# Patient Record
Sex: MALE | Race: WHITE | NOT HISPANIC OR LATINO | Employment: OTHER | URBAN - METROPOLITAN AREA
[De-identification: names, ages, dates, MRNs, and addresses within clinical notes are randomized per-mention and may not be internally consistent; named-entity substitution may affect disease eponyms.]

---

## 2017-01-27 ENCOUNTER — OFFICE VISIT (OUTPATIENT)
Dept: CARDIOLOGY | Facility: CLINIC | Age: 67
End: 2017-01-27

## 2017-01-27 VITALS
HEIGHT: 63 IN | BODY MASS INDEX: 31.01 KG/M2 | DIASTOLIC BLOOD PRESSURE: 72 MMHG | SYSTOLIC BLOOD PRESSURE: 132 MMHG | WEIGHT: 175 LBS | HEART RATE: 88 BPM

## 2017-01-27 DIAGNOSIS — I25.10 CORONARY ARTERY DISEASE INVOLVING NATIVE CORONARY ARTERY OF NATIVE HEART WITHOUT ANGINA PECTORIS: Primary | ICD-10-CM

## 2017-01-27 PROCEDURE — 99213 OFFICE O/P EST LOW 20 MIN: CPT | Performed by: INTERNAL MEDICINE

## 2017-01-27 RX ORDER — NITROGLYCERIN 0.4 MG/1
TABLET SUBLINGUAL
Qty: 50 TABLET | Refills: 3 | Status: SHIPPED | OUTPATIENT
Start: 2017-01-27 | End: 2018-04-06

## 2017-01-27 NOTE — PROGRESS NOTES
Date of Office Visit: 2017  Encounter Provider: Barak Magallon MD  Place of Service: [facility] CARDIOLOGY  Patient Name: David Austin Jr.  :1950    Chief complaint: Follow-up for coronary artery disease     History of Present Illness:    Dear Dr. Roman:      I again had the pleasure of seeing your patient in cardiology office on 2017. As  you well known, he is a very pleasant 66 year-old white male with a past medical history  significant for coronary artery disease, hypertension, and hyperlipidemia who presents for  Follow-up. The patient was first evaluated in our chest pain unit in the office on  2015. At that time, he had been experiencing exertional chest pressure accompanied  by significant dyspnea on exertion for approximately 1 month. His symptoms were very  concerning for unstable angina based on his description and the nature of the progression  of the symptoms. He subsequently underwent a diagnostic left hear catheterization on  2015 by Dr. Lau. At that time, he was found to have very significant coronary  artery disease. His LAD was large and had a 70% lesion followed by an 80% lesion in the  mid segment. The right coronary artery was also large and dominant and had diffuse 70%  mid disease, as well as a 90% focal distal stenosis followed by a 95% distal focal  stenosis as well. He subsequently underwent placement of a 3.25 x 38 mm Xience Alpine  drug eluting stent overlapping with a 3.0 x 8 mm Xience Alpine drug eluting stent to cover  both distal right coronary artery lesions. He also had a placement of a 4.0 x 38 mm Xience  drug eluting stent to the mid-right coronary artery which overlapped with the distal  stents. He also had placement of a 3.25 x 33 mm Xience Alpine drug eluting stent to the  mid-LAD at that time as well.      The patient presents today for follow-up.  Overall, he seems to be doing well.  He has   had no  chest pain or significant shortness of breath.  He does continue to smoke   approximately 2 packs of cigarettes per day.    Past Medical History   Diagnosis Date   • Chronic back pain    • Coronary artery disease 04/28/2015     s/p 2 CARMEN to mid to distal RCA and CARMEN to mid LAD on 4/28/15   • Hyperlipidemia    • Hypertension        Past Surgical History   Procedure Laterality Date   • Appendectomy     • Back surgery       x2   • Shoulder surgery       right    • Vasectomy     • Cardiac catheterization     • Coronary angioplasty  04/28/2015     A) Xience Alpine drug eluting stent [id number] overlapping with 3.0x8mm Xience alpine drug eluting stent to cover distal RCA . B) 4.0 x 38mm Xience Alpine drug eluting stent cover the mid RCA which overlaps with the distal stents. C) 3.25 x 33mm Xience Alpine drug eluting stent to mid LAD.        Current Outpatient Prescriptions on File Prior to Visit   Medication Sig Dispense Refill   • amLODIPine (NORVASC) 5 MG tablet Take  by mouth.     • Ascorbic Acid (VITAMIN C) 500 MG capsule Take  by mouth.     • aspirin 325 MG tablet Take  by mouth.     • atorvastatin (LIPITOR) 20 MG tablet TAKE 1 TABLET DAILY AT BEDTIME. 90 tablet 1   • HYDROcodone-acetaminophen (VICODIN) 7.5-500 MG per tablet Take  by mouth.     • metoprolol succinate XL (TOPROL-XL) 50 MG 24 hr tablet Take  by mouth.     • [DISCONTINUED] clopidogrel (PLAVIX) 75 MG tablet TAKE 1 TABLET BY MOUTH EVERY DAY 30 tablet 3     No current facility-administered medications on file prior to visit.      Allergies as of 01/27/2017 - Adrian as Reviewed 01/27/2017   Allergen Reaction Noted   • No known drug allergy  07/02/2016     Social History     Social History   • Marital status:      Spouse name: N/A   • Number of children: N/A   • Years of education: N/A     Occupational History   • Not on file.     Social History Main Topics   • Smoking status: Former Smoker     Packs/day: 2.00     Years: 50.00   • Smokeless tobacco:  "Never Used      Comment: Smoked 2-3 ppd for 50 years.  Quit at time of stents in 4/15.   • Alcohol use No   • Drug use: No   • Sexual activity: Not on file     Other Topics Concern   • Not on file     Social History Narrative     Family History   Problem Relation Age of Onset   • Coronary artery disease Neg Hx        Review of Systems   All other systems reviewed and are negative.    Objective:     Vitals:    01/27/17 1111   BP: 132/72   Pulse: 88   Weight: 175 lb (79.4 kg)   Height: 63\" (160 cm)     Body mass index is 31 kg/(m^2).    Physical Exam   Constitutional: He is oriented to person, place, and time. He appears well-developed and well-nourished.   HENT:   Head: Normocephalic and atraumatic.   Eyes: Conjunctivae are normal.   Neck: Neck supple.   Cardiovascular: Normal rate and regular rhythm.  Exam reveals no gallop and no friction rub.    No murmur heard.  Pulmonary/Chest: Effort normal. He has decreased breath sounds. He has rhonchi.   Abdominal: Soft. There is no tenderness.   Musculoskeletal: He exhibits no edema.   Neurological: He is alert and oriented to person, place, and time.   Skin: Skin is warm.   Psychiatric: He has a normal mood and affect. His behavior is normal.     Lab Review:   Procedures    Cardiac Procedures:  1. Left heart catheterization on 04/28/2015 showed the following; the left main   coronary artery was normal. The left circumflex had 30% diffuse mid disease  and 20% to 30% distal disease. The LAD was large and had a 70% to 80%   focal mid-stenosis. The right coronary artery was dominant and had diffuse   70% mid disease followed by a 905 focal distal stenosis which was then   followed by another 95% more distal stenosis.   a. Status post placement of a 3.25 x 38 mm Xience Alpine drug eluting stent   overlapping with a 3.0 x 8 mm Xience Alpine drug eluting stent to cover the   distal right coronary artery lesion.   b. Status post placement of a 4.0 x 38 mm Xience Alpine drug " eluting stent   to the mid right coronary artery which overlaps with the distal stents.   c. Status post placement of a 3.25 x 33 mm Xience Alpine drug eluting stent   to the mid LAD.      Assessment:       Diagnosis Plan   1. Coronary artery disease involving native coronary artery of native heart without angina pectoris       Plan:       As noted, the patient has not had any significant chest pain or shortness of breath.    Unfortunately, he continues to smoke approximately 2 packs of cigarettes per day.  I   discussed smoking cessation extensively with him again today.  Unfortunately, he does   not have a desire to quit currently.  I will continue to address this with him.  His blood   pressure is stable.  He is going to continue on the aspirin for life.  He will continue on   the Lipitor and Toprol-XL as well.  I will plan on seeing him back in the office in 8 months.    I did prescribe when necessary nitroglycerin to his pharmacy as well.    Coronary Artery Disease  Assessment  • The patient has no angina    Plan  • Lifestyle modifications discussed include adhering to a heart healthy diet, avoidance of tobacco products, maintenance of a healthy weight, medication compliance, regular exercise and regular monitoring of cholesterol and blood pressure    Subjective - Objective  • There has been a previous stent procedure using CARMEN on or around 4/18/2015  • Current antiplatelet therapy includes aspirin 325 mg

## 2017-09-29 ENCOUNTER — OFFICE VISIT (OUTPATIENT)
Dept: CARDIOLOGY | Facility: CLINIC | Age: 67
End: 2017-09-29

## 2017-09-29 VITALS
HEIGHT: 63 IN | DIASTOLIC BLOOD PRESSURE: 70 MMHG | SYSTOLIC BLOOD PRESSURE: 118 MMHG | HEART RATE: 77 BPM | OXYGEN SATURATION: 96 % | WEIGHT: 170 LBS | BODY MASS INDEX: 30.12 KG/M2

## 2017-09-29 DIAGNOSIS — I10 ESSENTIAL HYPERTENSION: ICD-10-CM

## 2017-09-29 DIAGNOSIS — I25.10 CORONARY ARTERY DISEASE INVOLVING NATIVE CORONARY ARTERY OF NATIVE HEART WITHOUT ANGINA PECTORIS: Primary | ICD-10-CM

## 2017-09-29 PROCEDURE — 99213 OFFICE O/P EST LOW 20 MIN: CPT | Performed by: INTERNAL MEDICINE

## 2017-10-08 NOTE — PROGRESS NOTES
Date of Office Visit: 2017  Encounter Provider: Barak Magallon MD  Place of Service: [facility] CARDIOLOGY  Patient Name: David Austin Jr.  :1950    Chief complaint: Follow-up for coronary artery disease.     History of Present Illness:    Dear Dr. Roman:     I again had the pleasure of seeing your patient in cardiology office on 2017. As  you well known, he is a very pleasant 66 year-old white male with a past medical history  significant for coronary artery disease, hypertension, and hyperlipidemia who presents   for follow-up. The patient was first evaluated in our chest pain unit in the office on   2015. At that time, he had been experiencing exertional chest pressure   accompanied by significant dyspnea on exertion for approximately 1 month. His   symptoms were very concerning for unstable angina based on his description and the   nature of the progression of the symptoms. He subsequently underwent a diagnostic   left hear catheterization on 2015 by Dr. Lau. At that time, he was found to have   very significant coronary artery disease. His LAD was large and had a 70% lesion   followed by an 80% lesion in the mid segment. The right coronary artery was also   large and dominant and had diffuse 70% mid disease, as well as a 90% focal distal   stenosis followed by a 95% distal focal stenosis as well. He subsequently underwent   placement of a 3.25 x 38 mm Xience Alpine drug eluting stent overlapping with a 3.0 x   8 mm Xience Alpine drug eluting stent to coverboth distal right coronary artery lesions.   He also had a placement of a 4.0 x 38 mm Xience drug eluting stent to the mid-right   coronary artery which overlapped with the distal stents. He also had placement of a   3.25 x 33 mm Xience Alpine drug eluting stent to the mid LAD at that time as well.    The patient presents today for follow-up.  Unfortunately, he is still smoking.  He has  not   had any chest pain or shortness of breath.  His blood pressure is excellent today 118/70.    Past Medical History:   Diagnosis Date   • Chronic back pain    • Coronary artery disease 04/28/2015    s/p 2 CARMEN to mid to distal RCA and CAREMN to mid LAD on 4/28/15   • Hyperlipidemia    • Hypertension        Past Surgical History:   Procedure Laterality Date   • APPENDECTOMY     • BACK SURGERY      x2   • CARDIAC CATHETERIZATION     • CORONARY ANGIOPLASTY  04/28/2015    A) Xience Alpine drug eluting stent [id number] overlapping with 3.0x8mm Xience alpine drug eluting stent to cover distal RCA . B) 4.0 x 38mm Xience Alpine drug eluting stent cover the mid RCA which overlaps with the distal stents. C) 3.25 x 33mm Xience Alpine drug eluting stent to mid LAD.    • SHOULDER SURGERY      right    • VASECTOMY         Current Outpatient Prescriptions on File Prior to Visit   Medication Sig Dispense Refill   • amLODIPine (NORVASC) 5 MG tablet Take  by mouth.     • Ascorbic Acid (VITAMIN C) 500 MG capsule Take  by mouth.     • aspirin 325 MG tablet Take  by mouth.     • atorvastatin (LIPITOR) 20 MG tablet TAKE 1 TABLET DAILY AT BEDTIME. 90 tablet 1   • HYDROcodone-acetaminophen (VICODIN) 7.5-500 MG per tablet Take  by mouth.     • metoprolol succinate XL (TOPROL-XL) 50 MG 24 hr tablet 50 mg Daily.     • nitroglycerin (NITROSTAT) 0.4 MG SL tablet 1 under the tongue as needed for angina, may repeat q5mins for up three doses 50 tablet 3     No current facility-administered medications on file prior to visit.      Allergies as of 09/29/2017 - Adrian as Reviewed 09/29/2017   Allergen Reaction Noted   • No known drug allergy  07/02/2016     Social History     Social History   • Marital status:      Spouse name: N/A   • Number of children: N/A   • Years of education: N/A     Occupational History   • Not on file.     Social History Main Topics   • Smoking status: Former Smoker     Packs/day: 2.00     Years: 50.00   • Smokeless  "tobacco: Never Used      Comment: Smoked 2-3 ppd for 50 years.  Quit at time of stents in 4/15.   • Alcohol use No   • Drug use: No   • Sexual activity: Not on file     Other Topics Concern   • Not on file     Social History Narrative     Family History   Problem Relation Age of Onset   • Coronary artery disease Neg Hx        Review of Systems   Constitution: Positive for malaise/fatigue.   All other systems reviewed and are negative.    Objective:     Vitals:    09/29/17 1023   BP: 118/70   Pulse: 77   SpO2: 96%   Weight: 170 lb (77.1 kg)   Height: 63\" (160 cm)     Body mass index is 30.11 kg/(m^2).    Physical Exam   Constitutional: He is oriented to person, place, and time. He appears well-developed and well-nourished.   HENT:   Head: Normocephalic and atraumatic.   Eyes: Conjunctivae are normal.   Neck: Neck supple.   Cardiovascular: Normal rate and regular rhythm.  Exam reveals no gallop and no friction rub.    No murmur heard.  Pulmonary/Chest: Effort normal. He has decreased breath sounds. He has rhonchi.   Abdominal: Soft. There is no tenderness.   Musculoskeletal: He exhibits no edema.   Neurological: He is alert and oriented to person, place, and time.   Skin: Skin is warm.   Psychiatric: He has a normal mood and affect. His behavior is normal.     Lab Review:   Procedures    Cardiac Procedures:  1. Left heart catheterization on 04/28/2015: The left main coronary artery was   normal. The left circumflex had 30% diffuse mid disease and 20% to 30%   distal disease. The LAD was large and had a 70% to 80% focal mid-stenosis.   The right coronary artery was dominant and had diffuse 70% mid disease   followed by a 905 focal distal stenosis which was then followed by another   95% more distal stenosis.   a. Status post placement of a 3.25 x 38 mm Xience Alpine drug eluting stent   overlapping with a 3.0 x 8 mm Xience Alpine drug eluting stent to cover the   distal right coronary artery lesion.   b. Status post " placement of a 4.0 x 38 mm Xience Alpine drug eluting stent   to the mid right coronary artery which overlaps with the distal stents.   c. Status post placement of a 3.25 x 33 mm Xience Alpine drug eluting stent   to the mid LAD.     Assessment:       Diagnosis Plan   1. Coronary artery disease involving native coronary artery of native heart without angina pectoris     2. Essential hypertension       Plan:       The patient seems to be stable from a cardiac standpoint this time.  Again, he does   continue to smoke, and I have discussed smoking cessation with a multiple times.  I   emphasized how important this is for his overall cardiovascular health. However, I do   not feel that he is ready to quit at this point.  He will continue on aspirin for life.  He is   taking Lipitor without any difficulty, and he is scheduled to get his labs drawn next   month at his regular primary care appointment.  I have asked him to send me a copy   of the lipid panel.  His blood pressure is under excellent control with the Toprol-XL   and amlodipine.  For now, I did not change any of his medications.  I will have him   follow-up in the next 8 months unless other issues arise.    Coronary Artery Disease  Assessment  • The patient has no angina   Plan  • Lifestyle modifications discussed include adhering to a heart healthy diet, avoidance of tobacco products, maintenance of a healthy weight, medication compliance, regular exercise and regular monitoring of cholesterol and blood pressure   Subjective - Objective  • There has been a previous stent procedure using CARMEN on or around 4/18/2015  • Current antiplatelet therapy includes aspirin 325 mg

## 2018-04-06 ENCOUNTER — APPOINTMENT (OUTPATIENT)
Dept: GENERAL RADIOLOGY | Facility: HOSPITAL | Age: 68
End: 2018-04-06

## 2018-04-06 ENCOUNTER — APPOINTMENT (OUTPATIENT)
Dept: CT IMAGING | Facility: HOSPITAL | Age: 68
End: 2018-04-06

## 2018-04-06 ENCOUNTER — HOSPITAL ENCOUNTER (INPATIENT)
Facility: HOSPITAL | Age: 68
LOS: 2 days | Discharge: HOME | End: 2018-04-08
Attending: EMERGENCY MEDICINE | Admitting: INTERNAL MEDICINE

## 2018-04-06 DIAGNOSIS — I20.0 UNSTABLE ANGINA (HCC): ICD-10-CM

## 2018-04-06 DIAGNOSIS — R07.9 CHEST PAIN, UNSPECIFIED TYPE: Primary | ICD-10-CM

## 2018-04-06 PROBLEM — I10 HYPERTENSION: Status: ACTIVE | Noted: 2018-04-06

## 2018-04-06 PROBLEM — D72.829 LEUKOCYTOSIS: Status: ACTIVE | Noted: 2018-04-06

## 2018-04-06 PROBLEM — I25.10 CAD (CORONARY ARTERY DISEASE): Status: ACTIVE | Noted: 2018-04-06

## 2018-04-06 LAB
ALBUMIN SERPL-MCNC: 4.2 G/DL (ref 3.5–5.2)
ALBUMIN/GLOB SERPL: 1.4 G/DL
ALP SERPL-CCNC: 87 U/L (ref 39–117)
ALT SERPL W P-5'-P-CCNC: 29 U/L (ref 1–41)
ANION GAP SERPL CALCULATED.3IONS-SCNC: 11.6 MMOL/L
AST SERPL-CCNC: 14 U/L (ref 1–40)
B PERT DNA SPEC QL NAA+PROBE: NOT DETECTED
BASOPHILS # BLD AUTO: 0.01 10*3/MM3 (ref 0–0.2)
BASOPHILS NFR BLD AUTO: 0.1 % (ref 0–1.5)
BILIRUB SERPL-MCNC: 0.4 MG/DL (ref 0.1–1.2)
BILIRUB UR QL STRIP: NEGATIVE
BUN BLD-MCNC: 22 MG/DL (ref 8–23)
BUN/CREAT SERPL: 28.2 (ref 7–25)
C PNEUM DNA NPH QL NAA+NON-PROBE: NOT DETECTED
CALCIUM SPEC-SCNC: 9.5 MG/DL (ref 8.6–10.5)
CHLORIDE SERPL-SCNC: 102 MMOL/L (ref 98–107)
CLARITY UR: CLEAR
CO2 SERPL-SCNC: 26.4 MMOL/L (ref 22–29)
COLOR UR: YELLOW
CREAT BLD-MCNC: 0.78 MG/DL (ref 0.76–1.27)
D-LACTATE SERPL-SCNC: 1.6 MMOL/L (ref 0.5–2)
DEPRECATED RDW RBC AUTO: 46.7 FL (ref 37–54)
EOSINOPHIL # BLD AUTO: 0.03 10*3/MM3 (ref 0–0.7)
EOSINOPHIL NFR BLD AUTO: 0.2 % (ref 0.3–6.2)
ERYTHROCYTE [DISTWIDTH] IN BLOOD BY AUTOMATED COUNT: 14.3 % (ref 11.5–14.5)
ERYTHROCYTE [SEDIMENTATION RATE] IN BLOOD: 11 MM/HR (ref 0–20)
FLUAV H1 2009 PAND RNA NPH QL NAA+PROBE: NOT DETECTED
FLUAV H1 HA GENE NPH QL NAA+PROBE: NOT DETECTED
FLUAV H3 RNA NPH QL NAA+PROBE: NOT DETECTED
FLUAV SUBTYP SPEC NAA+PROBE: NOT DETECTED
FLUBV RNA ISLT QL NAA+PROBE: NOT DETECTED
GFR SERPL CREATININE-BSD FRML MDRD: 99 ML/MIN/1.73
GLOBULIN UR ELPH-MCNC: 2.9 GM/DL
GLUCOSE BLD-MCNC: 109 MG/DL (ref 65–99)
GLUCOSE UR STRIP-MCNC: NEGATIVE MG/DL
HADV DNA SPEC NAA+PROBE: NOT DETECTED
HCOV 229E RNA SPEC QL NAA+PROBE: NOT DETECTED
HCOV HKU1 RNA SPEC QL NAA+PROBE: NOT DETECTED
HCOV NL63 RNA SPEC QL NAA+PROBE: NOT DETECTED
HCOV OC43 RNA SPEC QL NAA+PROBE: NOT DETECTED
HCT VFR BLD AUTO: 43.3 % (ref 40.4–52.2)
HGB BLD-MCNC: 14.4 G/DL (ref 13.7–17.6)
HGB UR QL STRIP.AUTO: NEGATIVE
HMPV RNA NPH QL NAA+NON-PROBE: NOT DETECTED
HOLD SPECIMEN: NORMAL
HOLD SPECIMEN: NORMAL
HPIV1 RNA SPEC QL NAA+PROBE: NOT DETECTED
HPIV2 RNA SPEC QL NAA+PROBE: NOT DETECTED
HPIV3 RNA NPH QL NAA+PROBE: NOT DETECTED
HPIV4 P GENE NPH QL NAA+PROBE: NOT DETECTED
IMM GRANULOCYTES # BLD: 0.1 10*3/MM3 (ref 0–0.03)
IMM GRANULOCYTES NFR BLD: 0.6 % (ref 0–0.5)
KETONES UR QL STRIP: NEGATIVE
LEUKOCYTE ESTERASE UR QL STRIP.AUTO: NEGATIVE
LYMPHOCYTES # BLD AUTO: 2.48 10*3/MM3 (ref 0.9–4.8)
LYMPHOCYTES NFR BLD AUTO: 13.9 % (ref 19.6–45.3)
M PNEUMO IGG SER IA-ACNC: NOT DETECTED
MCH RBC QN AUTO: 29.8 PG (ref 27–32.7)
MCHC RBC AUTO-ENTMCNC: 33.3 G/DL (ref 32.6–36.4)
MCV RBC AUTO: 89.6 FL (ref 79.8–96.2)
MONOCYTES # BLD AUTO: 0.93 10*3/MM3 (ref 0.2–1.2)
MONOCYTES NFR BLD AUTO: 5.2 % (ref 5–12)
NEUTROPHILS # BLD AUTO: 14.27 10*3/MM3 (ref 1.9–8.1)
NEUTROPHILS NFR BLD AUTO: 80 % (ref 42.7–76)
NITRITE UR QL STRIP: NEGATIVE
PH UR STRIP.AUTO: 6 [PH] (ref 5–8)
PLATELET # BLD AUTO: 344 10*3/MM3 (ref 140–500)
PMV BLD AUTO: 9.8 FL (ref 6–12)
POTASSIUM BLD-SCNC: 4.2 MMOL/L (ref 3.5–5.2)
PROCALCITONIN SERPL-MCNC: 0.04 NG/ML (ref 0.1–0.25)
PROT SERPL-MCNC: 7.1 G/DL (ref 6–8.5)
PROT UR QL STRIP: NEGATIVE
RBC # BLD AUTO: 4.83 10*6/MM3 (ref 4.6–6)
RHINOVIRUS RNA SPEC NAA+PROBE: NOT DETECTED
RSV RNA NPH QL NAA+NON-PROBE: NOT DETECTED
SODIUM BLD-SCNC: 140 MMOL/L (ref 136–145)
SP GR UR STRIP: >=1.03 (ref 1–1.03)
TROPONIN T SERPL-MCNC: <0.01 NG/ML (ref 0–0.03)
UROBILINOGEN UR QL STRIP: NORMAL
WBC NRBC COR # BLD: 17.82 10*3/MM3 (ref 4.5–10.7)
WHOLE BLOOD HOLD SPECIMEN: NORMAL
WHOLE BLOOD HOLD SPECIMEN: NORMAL

## 2018-04-06 PROCEDURE — 85025 COMPLETE CBC W/AUTO DIFF WBC: CPT

## 2018-04-06 PROCEDURE — 0 IOPAMIDOL PER 1 ML: Performed by: INTERNAL MEDICINE

## 2018-04-06 PROCEDURE — 83605 ASSAY OF LACTIC ACID: CPT | Performed by: EMERGENCY MEDICINE

## 2018-04-06 PROCEDURE — 81003 URINALYSIS AUTO W/O SCOPE: CPT | Performed by: EMERGENCY MEDICINE

## 2018-04-06 PROCEDURE — 84145 PROCALCITONIN (PCT): CPT | Performed by: EMERGENCY MEDICINE

## 2018-04-06 PROCEDURE — 93005 ELECTROCARDIOGRAM TRACING: CPT

## 2018-04-06 PROCEDURE — 87486 CHLMYD PNEUM DNA AMP PROBE: CPT | Performed by: EMERGENCY MEDICINE

## 2018-04-06 PROCEDURE — 84484 ASSAY OF TROPONIN QUANT: CPT

## 2018-04-06 PROCEDURE — 87798 DETECT AGENT NOS DNA AMP: CPT | Performed by: EMERGENCY MEDICINE

## 2018-04-06 PROCEDURE — 93005 ELECTROCARDIOGRAM TRACING: CPT | Performed by: EMERGENCY MEDICINE

## 2018-04-06 PROCEDURE — 71275 CT ANGIOGRAPHY CHEST: CPT

## 2018-04-06 PROCEDURE — 85652 RBC SED RATE AUTOMATED: CPT | Performed by: EMERGENCY MEDICINE

## 2018-04-06 PROCEDURE — 99285 EMERGENCY DEPT VISIT HI MDM: CPT

## 2018-04-06 PROCEDURE — 87040 BLOOD CULTURE FOR BACTERIA: CPT | Performed by: EMERGENCY MEDICINE

## 2018-04-06 PROCEDURE — 87581 M.PNEUMON DNA AMP PROBE: CPT | Performed by: EMERGENCY MEDICINE

## 2018-04-06 PROCEDURE — 25010000002 ENOXAPARIN PER 10 MG: Performed by: INTERNAL MEDICINE

## 2018-04-06 PROCEDURE — 87633 RESP VIRUS 12-25 TARGETS: CPT | Performed by: EMERGENCY MEDICINE

## 2018-04-06 PROCEDURE — 80053 COMPREHEN METABOLIC PANEL: CPT

## 2018-04-06 PROCEDURE — 93010 ELECTROCARDIOGRAM REPORT: CPT | Performed by: INTERNAL MEDICINE

## 2018-04-06 PROCEDURE — 71046 X-RAY EXAM CHEST 2 VIEWS: CPT

## 2018-04-06 RX ORDER — ONDANSETRON 2 MG/ML
4 INJECTION INTRAMUSCULAR; INTRAVENOUS EVERY 6 HOURS PRN
Status: DISCONTINUED | OUTPATIENT
Start: 2018-04-06 | End: 2018-04-08 | Stop reason: HOSPADM

## 2018-04-06 RX ORDER — ACETAMINOPHEN 325 MG/1
650 TABLET ORAL EVERY 4 HOURS PRN
Status: DISCONTINUED | OUTPATIENT
Start: 2018-04-06 | End: 2018-04-08 | Stop reason: HOSPADM

## 2018-04-06 RX ORDER — ASPIRIN 81 MG/1
81 TABLET ORAL DAILY
COMMUNITY

## 2018-04-06 RX ORDER — HYDROCODONE BITARTRATE AND ACETAMINOPHEN 7.5; 325 MG/1; MG/1
1 TABLET ORAL 4 TIMES DAILY PRN
COMMUNITY
End: 2019-09-17

## 2018-04-06 RX ORDER — ALUMINA, MAGNESIA, AND SIMETHICONE 2400; 2400; 240 MG/30ML; MG/30ML; MG/30ML
15 SUSPENSION ORAL EVERY 6 HOURS PRN
Status: DISCONTINUED | OUTPATIENT
Start: 2018-04-06 | End: 2018-04-08 | Stop reason: HOSPADM

## 2018-04-06 RX ORDER — NITROGLYCERIN 0.4 MG/1
0.4 TABLET SUBLINGUAL
COMMUNITY
End: 2019-01-17 | Stop reason: SDUPTHER

## 2018-04-06 RX ORDER — NITROGLYCERIN 0.4 MG/1
0.4 TABLET SUBLINGUAL
Status: DISCONTINUED | OUTPATIENT
Start: 2018-04-06 | End: 2018-04-08 | Stop reason: HOSPADM

## 2018-04-06 RX ORDER — ONDANSETRON 4 MG/1
4 TABLET, ORALLY DISINTEGRATING ORAL EVERY 6 HOURS PRN
Status: DISCONTINUED | OUTPATIENT
Start: 2018-04-06 | End: 2018-04-08 | Stop reason: HOSPADM

## 2018-04-06 RX ORDER — ATORVASTATIN CALCIUM 20 MG/1
20 TABLET, FILM COATED ORAL DAILY
Status: ON HOLD | COMMUNITY
End: 2020-03-06 | Stop reason: SDUPTHER

## 2018-04-06 RX ORDER — ONDANSETRON 4 MG/1
4 TABLET, FILM COATED ORAL EVERY 6 HOURS PRN
Status: DISCONTINUED | OUTPATIENT
Start: 2018-04-06 | End: 2018-04-08 | Stop reason: HOSPADM

## 2018-04-06 RX ORDER — NITROGLYCERIN 0.4 MG/1
0.4 TABLET SUBLINGUAL
Status: CANCELLED | OUTPATIENT
Start: 2018-04-06

## 2018-04-06 RX ORDER — SODIUM CHLORIDE 9 MG/ML
100 INJECTION, SOLUTION INTRAVENOUS CONTINUOUS
Status: DISCONTINUED | OUTPATIENT
Start: 2018-04-06 | End: 2018-04-07

## 2018-04-06 RX ORDER — BISACODYL 5 MG/1
5 TABLET, DELAYED RELEASE ORAL DAILY PRN
Status: DISCONTINUED | OUTPATIENT
Start: 2018-04-06 | End: 2018-04-08 | Stop reason: HOSPADM

## 2018-04-06 RX ORDER — SODIUM CHLORIDE 0.9 % (FLUSH) 0.9 %
10 SYRINGE (ML) INJECTION AS NEEDED
Status: DISCONTINUED | OUTPATIENT
Start: 2018-04-06 | End: 2018-04-08 | Stop reason: HOSPADM

## 2018-04-06 RX ADMIN — NITROGLYCERIN 0.4 MG: 0.4 TABLET SUBLINGUAL at 16:31

## 2018-04-06 RX ADMIN — SODIUM CHLORIDE 100 ML/HR: 9 INJECTION, SOLUTION INTRAVENOUS at 20:41

## 2018-04-06 RX ADMIN — ENOXAPARIN SODIUM 40 MG: 40 INJECTION SUBCUTANEOUS at 20:41

## 2018-04-06 RX ADMIN — IOPAMIDOL 95 ML: 755 INJECTION, SOLUTION INTRAVENOUS at 17:52

## 2018-04-06 NOTE — ED TRIAGE NOTES
midsternal chest pain started 0430 called EMS while at court today for chest pain 10/10 states pain 1/10 at this time. 81 mg ASA this morning at home, 1 nitro prior to EMS arrival. EMS gave patient 324 ASA

## 2018-04-06 NOTE — PROGRESS NOTES
Clinical Pharmacy Services: Medication History    David Austin Jr. is a 67 y.o. male presenting to [facility] for   Chief Complaint   Patient presents with   • Chest Pain       He  has a past medical history of Chronic back pain; Coronary artery disease (04/28/2015); Hyperlipidemia; and Hypertension.    Allergies as of 04/06/2018 - Reviewed 04/06/2018   Allergen Reaction Noted   • No known drug allergy  07/02/2016   • Relafen [nabumetone] Rash 04/06/2018       Medication information was obtained from: Patient, family, medication list  Pharmacy and Phone Number: [facility] [phone]    Prior to Admission Medications     Prescriptions Last Dose Informant Patient Reported? Taking?    amLODIPine (NORVASC) 5 MG tablet  Self Yes Yes    Take 5 mg by mouth Daily.    Ascorbic Acid (VITAMIN C) 500 MG capsule  Self Yes Yes    Take 500 mg by mouth Daily.    aspirin 81 MG EC tablet  Self Yes Yes    Take 81 mg by mouth Daily.    atorvastatin (LIPITOR) 20 MG tablet  Self Yes Yes    Take 20 mg by mouth Daily.    HYDROcodone-acetaminophen (NORCO) 7.5-325 MG per tablet  Self Yes Yes    Take 1 tablet by mouth 4 (Four) Times a Day As Needed for Moderate Pain .    metoprolol succinate XL (TOPROL-XL) 50 MG 24 hr tablet  Self Yes Yes    Take 50 mg by mouth Daily.    nitroglycerin (NITROSTAT) 0.4 MG SL tablet  Self Yes Yes    Place 0.4 mg under the tongue Every 5 (Five) Minutes As Needed for Chest Pain. Take no more than 3 doses in 15 minutes.            Medication notes: None    This medication list is complete to the best of my knowledge as of 4/6/2018    Please call if questions.    Windy Quintero, Medication History Technician  4/6/2018 5:47 PM

## 2018-04-06 NOTE — ED NOTES
Started having CP this morning, constant discomfort all day, mid-sternal, patient states he was [place] the court [place] this [place] and it became worse, lasting longer than 10 minutes. Daughter gave him x1 Nitro and patient felt better but still has discomfort. States feels like weights on my chest. Patient has been feeling SOB with productive cough for 3 weeks now. Patient has also been very stressed due to family issues. Has cardiac hx, x2 MIs past.      Maria G Guzmán RN  04/06/18 [id number]

## 2018-04-06 NOTE — ED PROVIDER NOTES
" EMERGENCY DEPARTMENT ENCOUNTER    CHIEF COMPLAINT  Chief Complaint: Chest pain  History given by: Pt and family  History limited by: Pt is a poor historian  Room Number: 42/42  PMD: Richard Roman MD      HPI:  Pt is a 67 y.o. male who presents complaining of central chest pain that woke him up from sleep at 0300 this morning. Pt describes his pain as \"feeling like I need to belch, but when I did that didn't help.\" Pt also c/o nausea and SOA. He denies diaphoresis. He states he took NTG at home which took his pain from a 10/10 to a 2/10. He states EMS gave him 3 ASA PTA. He denies any exacerbating or alleviating factors. Pt reports a hx of a cardiac catheterization in 2015, but states he does not remember the type of chest pain he had associated with that event. He reports he is a current smoker.     Duration:  13 hours  Onset: gradual  Timing: constant  Location: central chest  Radiation: none  Quality: \"feeling like I need to belch, but when I did that didn't help.\"   Intensity/Severity: initially a 10/10, but is now a 2/10  Progression: improving  Associated Symptoms: nausea and SOA  Aggravating Factors: none  Alleviating Factors: NTG  Previous Episodes: PT reports a hx of smoking  Treatment before arrival: Pt had a cardiac cath in 2015    PAST MEDICAL HISTORY  Active Ambulatory Problems     Diagnosis Date Noted   • No Active Ambulatory Problems     Resolved Ambulatory Problems     Diagnosis Date Noted   • No Resolved Ambulatory Problems     Past Medical History:   Diagnosis Date   • Chronic back pain    • Coronary artery disease 04/28/2015   • Hyperlipidemia    • Hypertension        PAST SURGICAL HISTORY  Past Surgical History:   Procedure Laterality Date   • APPENDECTOMY     • BACK SURGERY      x2   • CARDIAC CATHETERIZATION     • CORONARY ANGIOPLASTY  04/28/2015    A) Xience Alpine drug eluting stent [id number] overlapping with 3.0x8mm Xience alpine drug eluting stent to cover distal RCA . B) 4.0 x 38mm " Xience Alpine drug eluting stent cover the mid RCA which overlaps with the distal stents. C) 3.25 x 33mm Xience Alpine drug eluting stent to mid LAD.    • SHOULDER SURGERY      right    • VASECTOMY         FAMILY HISTORY  Family History   Problem Relation Age of Onset   • Coronary artery disease Neg Hx        SOCIAL HISTORY  Social History     Social History   • Marital status:      Spouse name: N/A   • Number of children: N/A   • Years of education: N/A     Occupational History   • Not on file.     Social History Main Topics   • Smoking status: Former Smoker     Packs/day: 2.00     Years: 50.00   • Smokeless tobacco: Never Used      Comment: Smoked 2-3 ppd for 50 years.  Quit at time of stents in 4/15.   • Alcohol use No   • Drug use: No   • Sexual activity: Not on file     Other Topics Concern   • Not on file     Social History Narrative   • No narrative on file       ALLERGIES  No known drug allergy and Relafen [nabumetone]    REVIEW OF SYSTEMS  Review of Systems   Constitutional: Negative for activity change, appetite change, diaphoresis and fever.   HENT: Negative for congestion and sore throat.    Eyes: Negative.    Respiratory: Positive for shortness of breath. Negative for cough.    Cardiovascular: Positive for chest pain. Negative for leg swelling.   Gastrointestinal: Positive for nausea. Negative for abdominal pain, diarrhea and vomiting.   Endocrine: Negative.    Genitourinary: Negative for decreased urine volume and dysuria.   Musculoskeletal: Negative for neck pain.   Skin: Negative for rash and wound.   Allergic/Immunologic: Negative.    Neurological: Negative for weakness, numbness and headaches.   Hematological: Negative.    Psychiatric/Behavioral: Negative.    All other systems reviewed and are negative.      PHYSICAL EXAM  ED Triage Vitals [04/06/18 1254]   Temp Heart Rate Resp BP SpO2   98.5 °F (36.9 °C) 70 16 141/86 97 %      Temp src Heart Rate Source Patient Position BP Location FiO2 (%)    Tympanic Monitor -- -- --       Physical Exam   Constitutional: He is oriented to person, place, and time and well-developed, well-nourished, and in no distress. No distress.   HENT:   Head: Normocephalic and atraumatic.   Eyes: EOM are normal. Pupils are equal, round, and reactive to light.   Neck: Normal range of motion. Neck supple.   Cardiovascular: Normal rate, regular rhythm, normal heart sounds and intact distal pulses.    Pulses:       Radial pulses are 2+ on the right side, and 2+ on the left side.        Dorsalis pedis pulses are 2+ on the right side, and 2+ on the left side.        Posterior tibial pulses are 2+ on the right side, and 2+ on the left side.   Pulmonary/Chest: Effort normal and breath sounds normal. No respiratory distress.   Abdominal: Soft. There is no tenderness. There is no rebound and no guarding.   Musculoskeletal: Normal range of motion. He exhibits no edema (pedal).   Neurological: He is alert and oriented to person, place, and time. He has normal sensation and normal strength.   Skin: Skin is warm and dry.   Psychiatric: Mood and affect normal.   Nursing note and vitals reviewed.      LAB RESULTS  Lab Results (last 24 hours)     Procedure Component Value Units Date/Time    CBC & Differential [[id number]] Collected:  04/06/18 1407    Specimen:  Blood Updated:  04/06/18 1422    Narrative:       The following orders were created for panel order CBC & Differential.  Procedure                               Abnormality         Status                     ---------                               -----------         ------                     CBC Auto Differential[[id number]]        Abnormal            Final result                 Please view results for these tests on the individual orders.    Comprehensive Metabolic Panel [[id number]]  (Abnormal) Collected:  04/06/18 1407    Specimen:  Blood Updated:  04/06/18 1443     Glucose 109 (H) mg/dL      BUN 22 mg/dL      Creatinine 0.78 mg/dL       Sodium 140 mmol/L      Potassium 4.2 mmol/L      Chloride 102 mmol/L      CO2 26.4 mmol/L      Calcium 9.5 mg/dL      Total Protein 7.1 g/dL      Albumin 4.20 g/dL      ALT (SGPT) 29 U/L      AST (SGOT) 14 U/L      Alkaline Phosphatase 87 U/L      Total Bilirubin 0.4 mg/dL      eGFR Non African Amer 99 mL/min/1.73      Globulin 2.9 gm/dL      A/G Ratio 1.4 g/dL      BUN/Creatinine Ratio 28.2 (H)     Anion Gap 11.6 mmol/L     Troponin [[id number]]  (Normal) Collected:  04/06/18 1407    Specimen:  Blood Updated:  04/06/18 1443     Troponin T <0.010 ng/mL     Narrative:       Troponin T Reference Ranges:  Less than 0.03 ng/mL:    Negative for AMI  0.03 to 0.09 ng/mL:      Indeterminant for AMI  Greater than 0.09 ng/mL: Positive for AMI    CBC Auto Differential [[id number]]  (Abnormal) Collected:  04/06/18 1407    Specimen:  Blood Updated:  04/06/18 1422     WBC 17.82 (H) 10*3/mm3      RBC 4.83 10*6/mm3      Hemoglobin 14.4 g/dL      Hematocrit 43.3 %      MCV 89.6 fL      MCH 29.8 pg      MCHC 33.3 g/dL      RDW 14.3 %      RDW-SD 46.7 fl      MPV 9.8 fL      Platelets 344 10*3/mm3      Neutrophil % 80.0 (H) %      Lymphocyte % 13.9 (L) %      Monocyte % 5.2 %      Eosinophil % 0.2 (L) %      Basophil % 0.1 %      Immature Grans % 0.6 (H) %      Neutrophils, Absolute 14.27 (H) 10*3/mm3      Lymphocytes, Absolute 2.48 10*3/mm3      Monocytes, Absolute 0.93 10*3/mm3      Eosinophils, Absolute 0.03 10*3/mm3      Basophils, Absolute 0.01 10*3/mm3      Immature Grans, Absolute 0.10 (H) 10*3/mm3     Sedimentation Rate [[id number]]  (Normal) Collected:  04/06/18 1645    Specimen:  Blood from Arm, Left Updated:  04/06/18 4987     Sed Rate 11 mm/hr           I ordered the above labs and reviewed the results    RADIOLOGY  XR Chest 2 View      FINDINGS: The lungs are well-expanded and clear and the heart and hilar  structures are normal. There is no acute disease.        I ordered the above noted radiological studies. Reviewed by me  in PACS.       PROCEDURES  Procedures    EKG           EKG time: 1321  Rhythm/Rate: nsr, 65  P waves and GA: normal  QRS, axis: narrow complex   ST and T waves: no acute process, normal QT     Interpreted Contemporaneously by me, independently viewed  unchanged compared to prior 04/2015      PROGRESS AND CONSULTS  ED Course     1613 - Informed pt of the results of his unremarkable lab work, CXR, and EKG. Stated to the pt that based on his cardiac history I am likely to admit the pt.     1615 - Consult placed with LCG.    1629 - NTG ordered.     1637 - Consulted with Dr. Carty ([facility] [place]) who states that he will see the pt in consult, but recommends admitting the pt to medicine. Sed rate ordered. Consult placed with A.     1720 - Consulted with Dr. Narayan ([facility]) who agrees to admit the pt. She requests I ordered additional labs and a CTA chest, which I will do.     1757 - Rechecked pt. Informed pt of the consult with Dr. Narayan and plans for admission. Restated his unremarkable labs, CXR, and EKG, but that I would like to admit him because he has a hx of cardiac cath in 2015 and that I am concerned for unstable angina. Pt understands and agrees with plan. All questions answered.     MEDICAL DECISION MAKING  Results were reviewed/discussed with the patient and they were also made aware of online access. Pt also made aware that some labs, such as cultures, will not be resulted during ER visit and follow up with PMD is necessary.     MDM  Number of Diagnoses or Management Options  Chest pain, unspecified type:      Amount and/or Complexity of Data Reviewed  Clinical lab tests: ordered and reviewed (WBC - 17.82  Troponin - negative)  Tests in the radiology section of CPT®: ordered and reviewed (CXR - no acute disease)  Tests in the medicine section of CPT®: reviewed and ordered (See EKG procedure note.)  Decide to obtain previous medical records or to obtain history from someone other than the patient: yes  Review and  summarize past medical records: yes (Heart Cath at 04/2015     PERFORMED:  1.  Left heart catheterization.   2.  Selective coronary angiography.   3.  Left ventricular angiogram.   4.  Drug-eluting stent placement of the mid to distal right coronary artery.   5.  Drug-eluting stent placement of the mid left anterior descending. )  Discuss the patient with other providers: yes (Dr. Carty ([facility] [place]  Dr. Narayan ([facility]))           DIAGNOSIS  Final diagnoses:   Chest pain, unspecified type       DISPOSITION  ADMISSION    Discussed treatment plan and reason for admission with pt/family and admitting physician.  Pt/family voiced understanding of the plan for admission for further testing/treatment as needed.       Latest Documented Vital Signs:  As of 6:22 PM  BP- 132/70 HR- 67 Temp- 98.5 °F (36.9 °C) (Tympanic) O2 sat- 96%    --  Documentation assistance provided by marsha Vega for Dr. Pierre.  Information recorded by the scribe was done at my direction and has been verified and validated by me.         Coy Vega  04/06/18 3217       Adrian Pierre MD  04/06/18 0210

## 2018-04-07 LAB
ANION GAP SERPL CALCULATED.3IONS-SCNC: 16.4 MMOL/L
BASOPHILS # BLD AUTO: 0 10*3/MM3 (ref 0–0.2)
BASOPHILS NFR BLD AUTO: 0 % (ref 0–1.5)
BUN BLD-MCNC: 20 MG/DL (ref 8–23)
BUN/CREAT SERPL: 27 (ref 7–25)
CALCIUM SPEC-SCNC: 9.2 MG/DL (ref 8.6–10.5)
CHLORIDE SERPL-SCNC: 101 MMOL/L (ref 98–107)
CO2 SERPL-SCNC: 22.6 MMOL/L (ref 22–29)
CREAT BLD-MCNC: 0.74 MG/DL (ref 0.76–1.27)
DEPRECATED RDW RBC AUTO: 49.6 FL (ref 37–54)
EOSINOPHIL # BLD AUTO: 0.08 10*3/MM3 (ref 0–0.7)
EOSINOPHIL NFR BLD AUTO: 0.6 % (ref 0.3–6.2)
ERYTHROCYTE [DISTWIDTH] IN BLOOD BY AUTOMATED COUNT: 14.7 % (ref 11.5–14.5)
GFR SERPL CREATININE-BSD FRML MDRD: 105 ML/MIN/1.73
GLUCOSE BLD-MCNC: 114 MG/DL (ref 65–99)
HCT VFR BLD AUTO: 41.5 % (ref 40.4–52.2)
HGB BLD-MCNC: 13.2 G/DL (ref 13.7–17.6)
IMM GRANULOCYTES # BLD: 0.06 10*3/MM3 (ref 0–0.03)
IMM GRANULOCYTES NFR BLD: 0.4 % (ref 0–0.5)
LYMPHOCYTES # BLD AUTO: 2.19 10*3/MM3 (ref 0.9–4.8)
LYMPHOCYTES NFR BLD AUTO: 16.3 % (ref 19.6–45.3)
MCH RBC QN AUTO: 29.6 PG (ref 27–32.7)
MCHC RBC AUTO-ENTMCNC: 31.8 G/DL (ref 32.6–36.4)
MCV RBC AUTO: 93 FL (ref 79.8–96.2)
MONOCYTES # BLD AUTO: 0.9 10*3/MM3 (ref 0.2–1.2)
MONOCYTES NFR BLD AUTO: 6.7 % (ref 5–12)
NEUTROPHILS # BLD AUTO: 10.23 10*3/MM3 (ref 1.9–8.1)
NEUTROPHILS NFR BLD AUTO: 76 % (ref 42.7–76)
PLATELET # BLD AUTO: 272 10*3/MM3 (ref 140–500)
PMV BLD AUTO: 10.3 FL (ref 6–12)
POTASSIUM BLD-SCNC: 3.9 MMOL/L (ref 3.5–5.2)
RBC # BLD AUTO: 4.46 10*6/MM3 (ref 4.6–6)
SODIUM BLD-SCNC: 140 MMOL/L (ref 136–145)
TROPONIN T SERPL-MCNC: <0.01 NG/ML (ref 0–0.03)
WBC NRBC COR # BLD: 13.46 10*3/MM3 (ref 4.5–10.7)

## 2018-04-07 PROCEDURE — G8979 MOBILITY GOAL STATUS: HCPCS

## 2018-04-07 PROCEDURE — 25010000002 ENOXAPARIN PER 10 MG: Performed by: INTERNAL MEDICINE

## 2018-04-07 PROCEDURE — 85025 COMPLETE CBC W/AUTO DIFF WBC: CPT | Performed by: INTERNAL MEDICINE

## 2018-04-07 PROCEDURE — 97161 PT EVAL LOW COMPLEX 20 MIN: CPT

## 2018-04-07 PROCEDURE — 99222 1ST HOSP IP/OBS MODERATE 55: CPT | Performed by: INTERNAL MEDICINE

## 2018-04-07 PROCEDURE — 84484 ASSAY OF TROPONIN QUANT: CPT | Performed by: INTERNAL MEDICINE

## 2018-04-07 PROCEDURE — 36415 COLL VENOUS BLD VENIPUNCTURE: CPT | Performed by: INTERNAL MEDICINE

## 2018-04-07 PROCEDURE — G8978 MOBILITY CURRENT STATUS: HCPCS

## 2018-04-07 PROCEDURE — 97110 THERAPEUTIC EXERCISES: CPT

## 2018-04-07 PROCEDURE — G8980 MOBILITY D/C STATUS: HCPCS

## 2018-04-07 RX ORDER — HYDROCODONE BITARTRATE AND ACETAMINOPHEN 7.5; 325 MG/1; MG/1
1 TABLET ORAL 4 TIMES DAILY PRN
Status: DISCONTINUED | OUTPATIENT
Start: 2018-04-07 | End: 2018-04-08 | Stop reason: HOSPADM

## 2018-04-07 RX ORDER — AMLODIPINE BESYLATE 5 MG/1
5 TABLET ORAL DAILY
Status: DISCONTINUED | OUTPATIENT
Start: 2018-04-07 | End: 2018-04-08 | Stop reason: HOSPADM

## 2018-04-07 RX ORDER — METOPROLOL SUCCINATE 50 MG/1
50 TABLET, EXTENDED RELEASE ORAL DAILY
Status: DISCONTINUED | OUTPATIENT
Start: 2018-04-07 | End: 2018-04-08 | Stop reason: HOSPADM

## 2018-04-07 RX ORDER — SODIUM CHLORIDE 9 MG/ML
50 INJECTION, SOLUTION INTRAVENOUS CONTINUOUS
Status: DISCONTINUED | OUTPATIENT
Start: 2018-04-07 | End: 2018-04-08 | Stop reason: HOSPADM

## 2018-04-07 RX ORDER — NITROGLYCERIN 0.4 MG/1
0.4 TABLET SUBLINGUAL
Status: DISCONTINUED | OUTPATIENT
Start: 2018-04-07 | End: 2018-04-07 | Stop reason: SDUPTHER

## 2018-04-07 RX ORDER — ASPIRIN 81 MG/1
81 TABLET ORAL DAILY
Status: DISCONTINUED | OUTPATIENT
Start: 2018-04-07 | End: 2018-04-08 | Stop reason: HOSPADM

## 2018-04-07 RX ORDER — ATORVASTATIN CALCIUM 20 MG/1
20 TABLET, FILM COATED ORAL DAILY
Status: DISCONTINUED | OUTPATIENT
Start: 2018-04-07 | End: 2018-04-08 | Stop reason: HOSPADM

## 2018-04-07 RX ADMIN — AMLODIPINE BESYLATE 5 MG: 5 TABLET ORAL at 14:03

## 2018-04-07 RX ADMIN — ENOXAPARIN SODIUM 40 MG: 40 INJECTION SUBCUTANEOUS at 20:35

## 2018-04-07 RX ADMIN — METOPROLOL SUCCINATE 50 MG: 50 TABLET, FILM COATED, EXTENDED RELEASE ORAL at 14:03

## 2018-04-07 RX ADMIN — SODIUM CHLORIDE 50 ML/HR: 9 INJECTION, SOLUTION INTRAVENOUS at 20:35

## 2018-04-07 RX ADMIN — ASPIRIN 81 MG: 81 TABLET ORAL at 14:03

## 2018-04-07 RX ADMIN — SODIUM CHLORIDE 100 ML/HR: 9 INJECTION, SOLUTION INTRAVENOUS at 06:33

## 2018-04-07 RX ADMIN — ATORVASTATIN CALCIUM 20 MG: 20 TABLET, FILM COATED ORAL at 14:03

## 2018-04-07 NOTE — H&P
"Name: David Austin Jr. ADMIT: 2018   : 1950  PCP: Richard Roman MD    MRN: [id number] LOS: 1 days   AGE/SEX: 67 y.o. male  ROOM: [facility]/     Chief Complaint   Patient presents with   • Chest Pain        History of Present Illness  Pt is a 67 y.o. male who presents complaining of central chest pain that woke him up from sleep at 0400 this morning. Pt describes his pain as \"feeling like I needed to belch, but when I did that didn't help.\"  This was associated with nausea and SOA. He denies diaphoresis. He states he took NTG at home which took his pain from a 10/10 to a 2/10.  He said the pain continued at a lower level all morning.  [place] and the pain worsened while he was there.  He took another nitroglycerin and the [facility]'s deputy called EMS.  He states EMS gave him 3 ASA PTA. He has a h/o CAD with previous stent placement. He continues to smoke. Cardiology was contacted about the pt but because his white count was elevated they did not want to be admitting.  He has had a cold recently and has had cough and congestion with some fever earlier but none the last few days. No dysuria, diarrhea, headache or skin rashes.      Past Medical History:   Diagnosis Date   • Chronic back pain    • Coronary artery disease 2015    s/p 2 CARMEN to mid to distal RCA and CARMEN to mid LAD on 4/28/15   • Hyperlipidemia    • Hypertension      Past Surgical History:   Procedure Laterality Date   • APPENDECTOMY     • BACK SURGERY      x2   • CARDIAC CATHETERIZATION     • CORONARY ANGIOPLASTY  2015    A) Xience Alpine drug eluting stent [id number] overlapping with 3.0x8mm Xience alpine drug eluting stent to cover distal RCA . B) 4.0 x 38mm Xience Alpine drug eluting stent cover the mid RCA which overlaps with the distal stents. C) 3.25 x 33mm Xience Alpine drug eluting stent to mid LAD.    • SHOULDER SURGERY      right    • VASECTOMY         Allergies:  No known drug allergy and " Relafen [nabumetone]    Prescriptions Prior to Admission   Medication Sig Dispense Refill Last Dose   • amLODIPine (NORVASC) 5 MG tablet Take 5 mg by mouth Daily.   Taking   • Ascorbic Acid (VITAMIN C) 500 MG capsule Take 500 mg by mouth Daily.   Taking   • aspirin 81 MG EC tablet Take 81 mg by mouth Daily.      • atorvastatin (LIPITOR) 20 MG tablet Take 20 mg by mouth Daily.      • HYDROcodone-acetaminophen (NORCO) 7.5-325 MG per tablet Take 1 tablet by mouth 4 (Four) Times a Day As Needed for Moderate Pain .      • metoprolol succinate XL (TOPROL-XL) 50 MG 24 hr tablet Take 50 mg by mouth Daily.   Taking   • nitroglycerin (NITROSTAT) 0.4 MG SL tablet Place 0.4 mg under the tongue Every 5 (Five) Minutes As Needed for Chest Pain. Take no more than 3 doses in 15 minutes.          Social History   Substance Use Topics   • Smoking status: Current Every Day Smoker     Packs/day: 3.00     Years: 50.00     Types: Cigarettes   • Smokeless tobacco: Never Used      Comment: Smoked 2-3 ppd for 50 years.  Quit at time of stents in 4/15.   • Alcohol use No       Family History   Problem Relation Age of Onset   • Coronary artery disease Neg Hx        Review of Systems   Constitutional: weight down 14 # since December, but intentional wt loss. Appetite good. No recent fevers or chills.  HEENT: No vision changes. No rhinorrhea. Had sore throat couple of wks ago when his cold started. Is hard of hearing.   Respiratory:  No history of wheezing.  Has some chronic cough on daily basis.  Cardiovascular: see HPI  No problems with edema  Gastrointestinal: gets occasional heartburn. Bowel movements normal. No blood noted in stools. No melena  Endocrine:  no diabetes   Genitourinary: No difficulty urinating, dysuria or frequency.   Musculoskeletal: No arthralgias or myalgias.   Skin: No rash or wound.   Neurological: Negative for syncope or headaches. No paresthesias or focal weakness  Hematological:  Does not bruise/bleed easily. No h/o  DVTs  Psychiatric/Behavioral: No confusion. The patient is not nervous/anxious.       Objective    Vital Signs  Temp:  [97.5 °F (36.4 °C)-98.5 °F (36.9 °C)] 97.5 °F (36.4 °C)  Heart Rate:  [66-88] 88  Resp:  [16-18] 18  BP: (112-148)/(69-86) 133/69  SpO2:  [94 %-97 %] 96 %  on   ;   Device (Oxygen Therapy): room air  Body mass index is 27.3 kg/m².    Physical Exam   WDWN male in NAD  PERRL, EOMI, sclera nonicteric. Oropharynx benign, edentulous.  Tongue midline, palate elevates symmetrically. Neck supple without adenopathy or thyromegaly. No JVD.  Lungs clear with equal breath sounds bilaterally. No wheezes, rales or rhonchi. Breathing nonlabored  Heart RRR without murmur, gallop or rub.   Back no kyphosis  Abdomen soft, nontender, bowel sounds present throughout.  Extremities no cyanosis, clubbing or edema.   Skin warm & dry  Neuro no gross motor deficits, alert & oriented. Speech fluent.       Results Review:   I reviewed the patient's new clinical results.    Lab Results (last 24 hours)     Procedure Component Value Units Date/Time    CBC & Differential [[id number]] Collected:  04/06/18 1407    Specimen:  Blood Updated:  04/06/18 1422    Narrative:       The following orders were created for panel order CBC & Differential.  Procedure                               Abnormality         Status                     ---------                               -----------         ------                     CBC Auto Differential[[id number]]        Abnormal            Final result                 Please view results for these tests on the individual orders.    Comprehensive Metabolic Panel [[id number]]  (Abnormal) Collected:  04/06/18 1407    Specimen:  Blood Updated:  04/06/18 1443     Glucose 109 (H) mg/dL      BUN 22 mg/dL      Creatinine 0.78 mg/dL      Sodium 140 mmol/L      Potassium 4.2 mmol/L      Chloride 102 mmol/L      CO2 26.4 mmol/L      Calcium 9.5 mg/dL      Total Protein 7.1 g/dL      Albumin 4.20 g/dL      ALT  (SGPT) 29 U/L      AST (SGOT) 14 U/L      Alkaline Phosphatase 87 U/L      Total Bilirubin 0.4 mg/dL      eGFR Non African Amer 99 mL/min/1.73      Globulin 2.9 gm/dL      A/G Ratio 1.4 g/dL      BUN/Creatinine Ratio 28.2 (H)     Anion Gap 11.6 mmol/L     Troponin [[id number]]  (Normal) Collected:  04/06/18 1407    Specimen:  Blood Updated:  04/06/18 1443     Troponin T <0.010 ng/mL     Narrative:       Troponin T Reference Ranges:  Less than 0.03 ng/mL:    Negative for AMI  0.03 to 0.09 ng/mL:      Indeterminant for AMI  Greater than 0.09 ng/mL: Positive for AMI    CBC Auto Differential [[id number]]  (Abnormal) Collected:  04/06/18 1407    Specimen:  Blood Updated:  04/06/18 1422     WBC 17.82 (H) 10*3/mm3      RBC 4.83 10*6/mm3      Hemoglobin 14.4 g/dL      Hematocrit 43.3 %      MCV 89.6 fL      MCH 29.8 pg      MCHC 33.3 g/dL      RDW 14.3 %      RDW-SD 46.7 fl      MPV 9.8 fL      Platelets 344 10*3/mm3      Neutrophil % 80.0 (H) %      Lymphocyte % 13.9 (L) %      Monocyte % 5.2 %      Eosinophil % 0.2 (L) %      Basophil % 0.1 %      Immature Grans % 0.6 (H) %      Neutrophils, Absolute 14.27 (H) 10*3/mm3      Lymphocytes, Absolute 2.48 10*3/mm3      Monocytes, Absolute 0.93 10*3/mm3      Eosinophils, Absolute 0.03 10*3/mm3      Basophils, Absolute 0.01 10*3/mm3      Immature Grans, Absolute 0.10 (H) 10*3/mm3     Sedimentation Rate [[id number]]  (Normal) Collected:  04/06/18 1645    Specimen:  Blood from Arm, Left Updated:  04/06/18 1737     Sed Rate 11 mm/hr     Urinalysis With / Microscopic If Indicated - Urine, Clean Catch [[id number]]  (Normal) Collected:  04/06/18 1820    Specimen:  Urine from Urine, Clean Catch Updated:  04/06/18 1837     Color, UA Yellow     Appearance, UA Clear     pH, UA 6.0     Specific Gravity, UA >=1.030     Glucose, UA Negative     Ketones, UA Negative     Bilirubin, UA Negative     Blood, UA Negative     Protein, UA Negative     Leuk Esterase, UA Negative     Nitrite, UA Negative  "    Urobilinogen, UA 0.2 E.U./dL    Narrative:       Urine microscopic not indicated.    Procalcitonin [[id number]]  (Abnormal) Collected:  04/06/18 1821    Specimen:  Blood from Arm, Right Updated:  04/06/18 1902     Procalcitonin 0.04 (L) ng/mL     Narrative:       As a Marker for Sepsis (Non-Neonates):   1. <0.5 ng/mL represents a low risk of severe sepsis and/or septic shock.  1. >2 ng/mL represents a high risk of severe sepsis and/or septic shock.    As a Marker for Lower Respiratory Tract Infections that require antibiotic therapy:  PCT on Admission     Antibiotic Therapy             6-12 Hrs later  > 0.5                Strongly Recommended            >0.25 - <0.5         Recommended  0.1 - 0.25           Discouraged                   Remeasure/reassess PCT  <0.1                 Strongly Discouraged          Remeasure/reassess PCT      As 28 day mortality risk marker: \"Change in Procalcitonin Result\" (> 80 % or <=80 %) if Day 0 (or Day 1) and Day 4 values are available. Refer to http://www.[facility]-pct-calculator.com/   Change in PCT <=80 %   A decrease of PCT levels below or equal to 80 % defines a positive change in PCT test result representing a higher risk for 28-day all-cause mortality of patients diagnosed with severe sepsis or septic shock.  Change in PCT > 80 %   A decrease of PCT levels of more than 80 % defines a negative change in PCT result representing a lower risk for 28-day all-cause mortality of patients diagnosed with severe sepsis or septic shock.                Lactic Acid, Plasma [[id number]]  (Normal) Collected:  04/06/18 1821    Specimen:  Blood from Arm, Right Updated:  04/06/18 2054     Lactate 1.6 mmol/L     Blood Culture - Blood, [[id number]] Collected:  04/06/18 1821    Specimen:  Blood from Arm, Right Updated:  04/06/18 1827    Basic Metabolic Panel [[id number]]  (Abnormal) Collected:  04/06/18 1821    Specimen:  Blood from Arm, Right Updated:  04/07/18 0029     Glucose 114 (H) mg/dL      " BUN 20 mg/dL      Creatinine 0.74 (L) mg/dL      Sodium 140 mmol/L      Potassium 3.9 mmol/L      Chloride 101 mmol/L      CO2 22.6 mmol/L      Calcium 9.2 mg/dL      eGFR Non African Amer 105 mL/min/1.73      BUN/Creatinine Ratio 27.0 (H)     Anion Gap 16.4 mmol/L     Narrative:       GFR Normal >60  Chronic Kidney Disease <60  Kidney Failure <15    Blood Culture - Blood, [[id number]] Collected:  04/06/18 1906    Specimen:  Blood from Arm, Right Updated:  04/06/18 1914    Respiratory Panel, PCR - Swab, Nasopharynx [[id number]]  (Normal) Collected:  04/06/18 1916    Specimen:  Swab from Nasopharynx Updated:  04/06/18 2154     ADENOVIRUS, PCR Not Detected     Coronavirus 229E Not Detected     Coronavirus HKU1 Not Detected     Coronavirus NL63 Not Detected     Coronavirus OC43 Not Detected     Human Metapneumovirus Not Detected     Human Rhinovirus/Enterovirus Not Detected     Influenza B PCR Not Detected     Parainfluenza Virus 1 Not Detected     Parainfluenza Virus 2 Not Detected     Parainfluenza Virus 3 Not Detected     Parainfluenza Virus 4 Not Detected     Bordetella pertussis pcr Not Detected     Influenza A H1 2009 PCR Not Detected     Chlamydophila pneumoniae PCR Not Detected     Mycoplasma pneumo by PCR Not Detected     Influenza A PCR Not Detected     Influenza A H3 Not Detected     Influenza A H1 Not Detected     RSV, PCR Not Detected            Results from last 7 days  Lab Units 04/06/18  1407   WBC 10*3/mm3 17.82*   HEMOGLOBIN g/dL 14.4   PLATELETS 10*3/mm3 344     Results from last 7 days  Lab Units 04/06/18  1821 04/06/18  1407   SODIUM mmol/L 140 140   POTASSIUM mmol/L 3.9 4.2   CHLORIDE mmol/L 101 102   CO2 mmol/L 22.6 26.4   BUN mg/dL 20 22   CREATININE mg/dL 0.74* 0.78   GLUCOSE mg/dL 114* 109*   ALBUMIN g/dL  --  4.20   BILIRUBIN mg/dL  --  0.4   ALK PHOS U/L  --  87   AST (SGOT) U/L  --  14   ALT (SGPT) U/L  --  29   Estimated Creatinine Clearance: 76.5 mL/min (by C-G formula based on SCr of 0.74  mg/dL (L)).  Results from last 7 days  Lab Units 04/06/18  1407   TROPONIN T ng/mL <0.010         Invalid input(s): LDLCALC  Results from last 7 days  Lab Units 04/06/18  1820   NITRITE UA  Negative       CT Angiogram Chest With Contrast   Preliminary Result   No evidence of pulmonary embolus.       Radiation dose reduction techniques were utilized, including automated   exposure control and exposure modulation based on body size.              XR Chest 2 View           Assessment/Plan   Assessment:      Active Hospital Problems (** Indicates Principal Problem)    Diagnosis Date Noted   • **Chest pain [R07.9] 04/06/2018   • Hypertension [I10] 04/06/2018   • Leukocytosis [D72.829] 04/06/2018   • CAD (coronary artery disease) [I25.10] 04/06/2018      Resolved Hospital Problems    Diagnosis Date Noted Date Resolved   No resolved problems to display.       Plan:     His chest CT was negative and his PCT was normal. At this point I don't have a source of infection so I'm not going to put him on an antibiotic. I will be rechecking his white count today and hopefully it will be back down.  He'll be seen by cardiology as well.  I told him that he would need a stress test at a minimum.            Ann Narayan MD  [facility]  04/07/18  1:28 AM

## 2018-04-07 NOTE — CONSULTS
Date of Consultation: 18    Referral Provider: Ann Narayan MD     Reason for Consultation: Chest Pain    Encounter Provider: Barak Magallon MD    Group of Service: [facility]     Patient Name: David Austin Jr.    :1950    Chief complaint:  Chest pain    History of Present Illness:      Mr Austin is a 67 year old patient of mine with a history of coronary artery disease -s/p CARMEN to LAD and RCA in , hypertension, and hyperlipidemia.  He was last seen in the office in 2017.  At the time of that visit, he was having no chest pain or SOA.  He does continue to smoke.    He presented to the ED last night with complaints of chest pain that woke him up at 0300 associated with nausea and SOA. He felt like he needed to burp but that didn't help.  He also described it as a severe pressure and throbbing in the center of his chest.  It did not radiate.  He took NTG at home with decrease in his pain from a 10/10-2/10.  He was given 3 NTG per EMS. His labs were unremarkable except for WBC elevated at 17.82.  His troponin's have been negative x 2.        Previous Cardiac Testing   Cardiac Catheterization 2015  PROCEDURES PERFORMED:  1.  Left heart catheterization.   2.  Selective coronary angiography.   3.  Left ventricular angiogram.   4.  Drug-eluting stent placement of the mid to distal right coronary artery.   5.  Drug-eluting stent placement of the mid left anterior descending.      DESCRIPTION OF PROCEDURE AND FINDINGS:  After informed consent was obtained,  the patient was brought back to the cardiac catheterization laboratory, where  his right wrist was prepped and draped in a sterile manner.  Access to the  right radial artery was obtained using a micropuncture needle followed by  placement of a 5-Solomon Islander glide sheath.  Selective angiograms were performed  using a 5-Sol[place] Islander JL-3.5, 5-Sol[place] Islander AR modified catheter.  Left heart  catheterization and left  ventricular angiogram were performed using a 5-Guinean  pigtail catheter.      Findings of left heart catheterization, selective coronary angiogram, and left  ventricular angiogram are the followin.  Left main with 0% stenosis.  It bifurcates into an LAD and left circumflex  branches.   2.  Left circumflex is a large vessel giving off a large first obtuse marginal  branch.  There is diffuse 30% stenosis of the midleft circumflex.  The distal  left circumflex had 20% to 30% stenosis.   3.  LAD was a large vessel giving off 2 small diagonal branches.  There was a  focal 70% and focal 80% stenoses of the mid LAD.   4.  Right coronary artery was a large vessel giving off moderate size PDA and  PLV branch.  This is a right dominant system.  There was diffuse 70% stenosis  of the mid RCA.  The distal RCA, there was 90% followed by 95% more distal  stenosis just before the bifurcation of the PDA and PLV branch.  There was  collateral flow seen provided by the distal left circumflex providing the  distal PDA and PLV branch.   5.  Left ventricular systolic pressure was measured at 131 with a left  ventricular end-diastolic pressure of 12 mmHg.  6.  Left ventricular angiogram showed normal LV systolic function and wall  motion with an EF of greater than 60%.   7.  Upon pullback of the pigtail catheter across the aortic valve, there is no  significant gradient.      Based on the findings, I proceeded first with intervention of the right  coronary artery.  A 5-Guinean sheath was exchanged for a 6-Guinean glide sheath.   The patient was given boluses of heparin to keep his ACT above 250 throughout  the procedure.  He received a total of 12,000 units of heparin.  Next, using a  6-Guinean JR 4, guiding catheter was used to cannulate the right coronary  artery.  A 0.014 BMW wire was used to cross the lesion and place in the distal  PDA.  Lesion was then predilated with a Trek 2.5 x 12 mm balloon at maximum  pressure of 8 norma.   Next, proceeded with placement of a Xience Alpine 3.25 x 38  mm stent in order to cover both distal stenoses.  Unfortunately, because the  stent was occlusive and there was poor visualization of the stent placement,  the distal edge of the stent did not cover all of the disease of the distal  RCA.  For this reason, I placed a second Xience Alpine 3.0 x 8 mm stent  overlapping with that stent.  Both of these stents were then postdilated with  an NC Trek 3.5 x 25 mm balloon at a maximum pressure of 16 norma.  Repeat  angiogram showed good angiographic results.  In order to address the 70%  diffuse stenosis of the mid-RCA, decided to place a second stent.  Because the  mid RCA was diffusely diseased, decided to cover the entire mid RCA with a  second stent overlapping with the distal RCA stent.  I placed Xience Alpine 4.0  x 38 mm stent which was deployed at maximum pressure of 14 norma.  This was then  postdilated with an NC Trek 4.0 x 20 mm balloon at a maximum pressure of 16  norma.  Repeat angiogram showed good angiographic results.  The stents appeared  well-sized and well-apposed.  There was DIANE-3 flow seen in the distal vessel.   Smaller branches of the main RCA remain patent.      Next, my attention was turned to the LAD.  Using a 6-Nepali XB-30 guiding  catheter was used to cannulate the left main artery.  The same 0.014 BMW wire  was used to cross the lesion.  This was then predilated with a Trek 2.5 x 12 mm  balloon at maximum pressure of 10 norma.  Next, I proceeded with stent placement  with a Xience Alpine 3.25 x 33 mm stent, which was deployed at a maximum  pressure of 16 norma.  It was then postdilated with NC Trek 3.5 x 25 mm balloon  at a maximum pressure of 18 norma proximally and 12 norma distally.  Repeat  angiogram showed good angiographic results.  The stent appeared to be  well-sized and well-apposed.  There is no evidence of dissection, perforation,  or intramural hematoma.  There continued to be DIANE-3  flow in the distal LAD.      The patient did receive 600 mg of Plavix x1 in the catheterization lab at the  start of the intervention.      After all wires and catheters were removed, the 6-Dutch sheath was removed and  a comfort band was placed.  After patent hemostasis was achieved, the patient  was transferred to [facility] in stable condition.  There were no immediate  complications.  The patient tolerated the procedure well.      POSTPROCEDURE DIAGNOSIS:  Unstable angina, status post drug-eluting stent  placement of a mid to distal RCA and the mid LAD.         Past Medical History:   Diagnosis Date   • Chronic back pain    • Coronary artery disease 04/28/2015    s/p 2 CARMEN to mid to distal RCA and CARMEN to mid LAD on 4/28/15   • Hyperlipidemia    • Hypertension          Past Surgical History:   Procedure Laterality Date   • APPENDECTOMY     • BACK SURGERY      x2   • CARDIAC CATHETERIZATION     • CORONARY ANGIOPLASTY  04/28/2015    A) Xience Alpine drug eluting stent [id number] overlapping with 3.0x8mm Xience alpine drug eluting stent to cover distal RCA . B) 4.0 x 38mm Xience Alpine drug eluting stent cover the mid RCA which overlaps with the distal stents. C) 3.25 x 33mm Xience Alpine drug eluting stent to mid LAD.    • SHOULDER SURGERY      right    • VASECTOMY           Allergies   Allergen Reactions   • No Known Drug Allergy    • Relafen [Nabumetone] Rash         No current facility-administered medications on file prior to encounter.      Current Outpatient Prescriptions on File Prior to Encounter   Medication Sig Dispense Refill   • amLODIPine (NORVASC) 5 MG tablet Take 5 mg by mouth Daily.     • Ascorbic Acid (VITAMIN C) 500 MG capsule Take 500 mg by mouth Daily.     • metoprolol succinate XL (TOPROL-XL) 50 MG 24 hr tablet Take 50 mg by mouth Daily.           Social History     Social History   • Marital status:      Spouse name: N/A   • Number of children: N/A   • Years of education: N/A     Occupational  "History   • Not on file.     Social History Main Topics   • Smoking status: Current Every Day Smoker     Packs/day: 3.00     Years: 50.00     Types: Cigarettes   • Smokeless tobacco: Never Used      Comment: Smoked 2-3 ppd for 50 years.  Quit at time of stents in 4/15.   • Alcohol use No   • Drug use: No   • Sexual activity: Not on file     Other Topics Concern   • Not on file     Social History Narrative   • No narrative on file         Family History   Problem Relation Age of Onset   • Coronary artery disease Neg Hx        REVIEW OF SYSTEMS:   Pertinent positives were noted in the HPI above.  Otherwise, all other systems were reviewed and were negative.       Objective:     Vitals:    04/06/18 2022 04/07/18 0043 04/07/18 0446 04/07/18 0829   BP: 126/76 133/69  140/83   BP Location: Right arm Right arm  Right arm   Patient Position: Lying Lying  Lying   Pulse: 72 88  73   Resp: 18 18 18   Temp: 97.9 °F (36.6 °C) 97.5 °F (36.4 °C)  97.9 °F (36.6 °C)   TempSrc: Oral Oral  Oral   SpO2: 96% 96%  95%   Weight: 69.9 kg (154 lb 1.6 oz)  69.9 kg (154 lb 3.2 oz)    Height:         Body mass index is 27.32 kg/m².  Flowsheet Rows    Flowsheet Row First Filed Value   Admission Height 160 cm (63\") Documented at 04/06/2018 1254   Admission Weight 72.1 kg (159 lb) Documented at 04/06/2018 1254           General:    No acute distress, alert and oriented x4, pleasant                   Head:    Normocephalic, atraumatic.   Eyes:          Conjunctivae and sclerae normal, no icterus, PERRLA   Throat:   No oral lesions, no thrush, oral mucosa moist.    Neck:   Supple, trachea midline.   Lungs:     Decreased breath sounds bilaterally, rhonci bilaterally     Heart:    Regular rhythm and normal rate.  No murmurs, gallops, or rubs noted.   Abdomen:     Soft, non-tender, non-distended, positive bowel sounds.    Extremities:   No clubbing, cyanosis, or edema.     Pulses:   Pulses palpable and equal bilaterally.    Skin:   No bleeding or " rash.   Neuro:   Non-focal.  Moves all extremities well.    Psychiatric:   Normal mood and affect.         Lab Review:                  Results from last 7 days  Lab Units 04/06/18  1821   SODIUM mmol/L 140   POTASSIUM mmol/L 3.9   CHLORIDE mmol/L 101   CO2 mmol/L 22.6   BUN mg/dL 20   CREATININE mg/dL 0.74*   GLUCOSE mg/dL 114*   CALCIUM mg/dL 9.2       Results from last 7 days  Lab Units 04/07/18  0601 04/06/18  1407   TROPONIN T ng/mL <0.010 <0.010       Results from last 7 days  Lab Units 04/07/18  0601   WBC 10*3/mm3 13.46*   HEMOGLOBIN g/dL 13.2*   HEMATOCRIT % 41.5   PLATELETS 10*3/mm3 272                     CT of chest  IMPRESSION:  No evidence of pulmonary embolus.    CXR  FINDINGS: The lungs are well-expanded and clear and the heart and hilar  structures are normal. There is no acute disease.    Telemetry        EKG (reviewed by me personally):      Assessment:   1. Unstable angina  2. CAD status post 3 stents to RCA and stent to LAD on 4/28/15  3. Ongoing tobacco use  4. Hypertension  5. Leukoctosis    Plan:       The patient's chest discomfort is consistent with unstable angina.  Unfortunately, he also continues to smoke, and I have discussed this with him and recommended quitting many times.  He had 3 drug-eluting stents to his right coronary artery and one drug-eluting stent to his LAD in 2015.  I have recommended proceeding with a diagnostic left heart catheterization at this time.  I did discuss in detail with the patient, and he agrees with the plan.  This will be set up for tomorrow morning with Dr. Carlton.  He will be hydrated in preparation for this.  He will continue on the aspirin, Lipitor, and Toprol-XL as well.  I also discussed his case with Dr. [facility] from [facility], and I am going to transfer him to my service at this point.  He does have leukocytosis, although no signs of an active infection.    Jesse Magallon M.D.

## 2018-04-07 NOTE — PLAN OF CARE
Problem: Patient Care Overview  Goal: Plan of Care Review  Outcome: Ongoing (interventions implemented as appropriate)   04/07/18 5183   Coping/Psychosocial   Plan of Care Reviewed With patient   Plan of Care Review   Progress improving   OTHER   Outcome Summary VSS, on RA. Ambulating frequently w/o c/o. Denies cp/soa. on IVFs. To have Heart cath in a.m. by dr. Carlton. Will closely monitor.       Problem: Fall Risk (Adult)  Goal: Identify Related Risk Factors and Signs and Symptoms  Outcome: Outcome(s) achieved Date Met: 04/07/18    Goal: Absence of Fall  Outcome: Ongoing (interventions implemented as appropriate)      Problem: Pain, Acute (Adult)  Goal: Identify Related Risk Factors and Signs and Symptoms  Outcome: Outcome(s) achieved Date Met: 04/07/18    Goal: Acceptable Pain Control/Comfort Level  Outcome: Ongoing (interventions implemented as appropriate)

## 2018-04-07 NOTE — PLAN OF CARE
Problem: Patient Care Overview  Goal: Plan of Care Review  Outcome: Outcome(s) achieved Date Met: 04/07/18 04/07/18 0936   Coping/Psychosocial   Plan of Care Reviewed With patient   Plan of Care Review   Progress improving   OTHER   Outcome Summary Pt presented to PT with full functional mobility and ambulated around clinic 2x with no AD and SBA by PT only to manage IV pole. Pt also ambulated up and down 3 stairs using step-to gait pattern and no use of handrail. Patient also demonstrates good balance on feet as he was able to walk bwd 15' in room. At this time, pt is at baseline level of functional mobility and would not benefit from skilled PT.

## 2018-04-07 NOTE — THERAPY DISCHARGE NOTE
Acute Care - Physical Therapy Initial Eval/Discharge  [facility]     Patient Name: David Austin Jr.  : 1950  MRN: [id number]  Today's Date: 2018   Onset of Illness/Injury or Date of Surgery: 18  Date of Referral to PT: 18  Referring Physician: Ralph      Admit Date: 2018    Visit Dx:    ICD-10-CM ICD-9-CM   1. Chest pain, unspecified type R07.9 786.50     Patient Active Problem List   Diagnosis   • Chest pain   • Hypertension   • Leukocytosis   • CAD (coronary artery disease)     Past Medical History:   Diagnosis Date   • Chronic back pain    • Coronary artery disease 2015    s/p 2 CARMEN to mid to distal RCA and CARMEN to mid LAD on 4/28/15   • Hyperlipidemia    • Hypertension      Past Surgical History:   Procedure Laterality Date   • APPENDECTOMY     • BACK SURGERY      x2   • CARDIAC CATHETERIZATION     • CORONARY ANGIOPLASTY  2015    A) Xience Alpine drug eluting stent [id number] overlapping with 3.0x8mm Xience alpine drug eluting stent to cover distal RCA . B) 4.0 x 38mm Xience Alpine drug eluting stent cover the mid RCA which overlaps with the distal stents. C) 3.25 x 33mm Xience Alpine drug eluting stent to mid LAD.    • SHOULDER SURGERY      right    • VASECTOMY            PT ASSESSMENT (last 72 hours)      Physical Therapy Evaluation     Row Name 18 0926          PT Evaluation Time/Intention    Subjective Information no complaints  -CH     Document Type evaluation  -CH     Mode of Treatment physical therapy  -CH     Total Evaluation Minutes, Physical Therapy 10  -CH     Patient Effort good  -CH     Symptoms Noted During/After Treatment none  -     Row Name 18 0984          General Information    Patient Profile Reviewed? yes  -CH     Onset of Illness/Injury or Date of Surgery 18  -     Referring Physician Ralph  -     Patient Observations alert;cooperative;agree to therapy  -     General Observations of Patient male pt, supine in bed, no  acute distress noted  -     Prior Level of Function independent:  -CH     Equipment Currently Used at Home cane, straight  -     Pertinent History of Current Functional Problem Pt admitted to [facility] when he had onset of chest pain while at [facility]  -     Existing Precautions/Restrictions no known precautions/restrictions  -     Risks Reviewed patient:  -CH     Benefits Reviewed patient:  -     Barriers to Rehab none identified  -     Row Name 04/07/18 0926          Relationship/Environment    Primary Source of Support/Comfort significant other  -     Lives With significant other  -     Row Name 04/07/18 0926          Resource/Environmental Concerns    Current Living Arrangements home/apartment/condo  -     Row Name 04/07/18 0926          Cognitive Assessment/Intervention- PT/OT    Orientation Status (Cognition) oriented x 4  -     Follows Commands (Cognition) follows multi-step commands;over 90% accuracy  -     Row Name 04/07/18 0926          Safety Issues, Functional Mobility    Safety Issues Affecting Function (Mobility) ability to follow commands  -     Comment, Safety Issues/Impairments (Mobility) no safety impairments  -     Row Name 04/07/18 0926          Bed Mobility Assessment/Treatment    Bed Mobility Assessment/Treatment bed mobility (all) activities  -     [place] Level (Bed Mobility) independent  -     Row Name 04/07/18 0926          Transfer Assessment/Treatment    Transfer Assessment/Treatment sit-stand transfer  -     Sit-Stand [place] (Transfers) conditional independence  -     Row Name 04/07/18 0926          Gait/Stairs Assessment/Training    [place] Level (Gait) supervision  -     Distance in Feet (Gait) 200  -     [place] Level (Stairs) supervision;1 person to manage equipment  -     Handrail Location (Stairs) none  -     Number of Steps (Stairs) 3  -     Ascending Technique (Stairs) step-to-step  -     Descending Technique (Stairs)  step-to-step  -[facility] Name 04/07/18 0926          General ROM    GENERAL ROM COMMENTS no general ROM impairments noted  -[facility] Name 04/07/18 0926          General Assessment (Manual Muscle Testing)    Comment, General Manual Muscle Testing (MMT) Assessment all gross LE MMTs >3/5  -[facility] Name 04/07/18 0926          Pain Assessment    Additional Documentation Pain Scale: Word Pre/Post-Treatment (Group)  -[facility] Name 04/07/18 0926          Pain Scale: Word Pre/Post-Treatment    Pain: Word Scale, Pretreatment 0 - no pain  -[facility] Name 04/07/18 0926          Physical Therapy Clinical Impression    Date of Referral to PT 04/07/18  -     Functional Level at Time of Evaluation (PT Clinical Impression) independent  -     Patient/Family Goals Statement (PT Clinical Impression) D/C home  -     Criteria for Skilled Interventions Met (PT Clinical Impression) no problems identified which require skilled intervention  -     Rehab Potential (PT Clinical Summary) good, to achieve stated therapy goals  -CH     Row Name 04/07/18 0926          Physical Therapy Discharge Summary    Additional Documentation Discharge Summary, PT Eval (Group)  -CH     Row Name 04/07/18 0926          Discharge Summary, PT Eval    Reason for Discharge (PT Discharge Summary) no further needs identified  -       User Key  (r) = Recorded By, (t) = Taken By, (c) = Cosigned By    Initials Name Provider Type    ZACHARY Leiva, PT Physical Therapist          Physical Therapy Education     Title: PT OT SLP Therapies (Resolved)     Topic: Physical Therapy (Resolved)     Point: Mobility training (Resolved)    Learning Progress Summary     Learner Status Readiness Method Response Comment Documented by    Patient Done Acceptance E DAWIT ROSALES CH 04/07/18 0936          Point: Home exercise program (Resolved)    Learning Progress Summary     Learner Status Readiness Method Response Comment Documented by    Patient Done Acceptance E DAWIT ROSALES CH  04/07/18 0936          Point: Body mechanics (Resolved)    Learning Progress Summary     Learner Status Readiness Method Response Comment Documented by    Patient Done Acceptance E DAWIT ROSALES   04/07/18 0936          Point: Precautions (Resolved)    Learning Progress Summary     Learner Status Readiness Method Response Comment Documented by    Patient Done Acceptance DAWIT PRIETO   04/07/18 0936                      User Key     Initials Effective Dates Name Provider Type Discipline     04/03/18 -  Ankur Leiva, PT Physical Therapist PT                PT Recommendation and Plan  Anticipated Discharge Disposition (PT): home or self care  Therapy Frequency (PT Clinical Impression): evaluation only  Outcome Summary/Treatment Plan (PT)  Anticipated Discharge Disposition (PT): home or self care  Reason for Discharge (PT Discharge Summary): no further needs identified  Plan of Care Reviewed With: patient  Progress: improving  Outcome Summary: Pt presented to PT with full functional mobility and ambulated around clinic 2x with no AD and SBA by PT only to manage IV pole. Pt also ambulated up and down 3 stairs using step-to gait pattern and no use of handrail. Patient also demonstrates good balance on feet as he was able to walk bwd 15' in room. At this time, pt is at baseline level of functional mobility and would not benefit from skilled PT.          Outcome Measures     Row Name 04/07/18 0900             How much help from another person do you currently need...    Turning from your back to your side while in flat bed without using bedrails? 4  -CH      Moving from lying on back to sitting on the side of a flat bed without bedrails? 4  -CH      Moving to and from a bed to a chair (including a wheelchair)? 4  -CH      Standing up from a chair using your arms (e.g., wheelchair, bedside chair)? 4  -CH      Climbing 3-5 steps with a railing? 3  -CH      To walk in hospital room? 4  -CH      AM-PAC 6 Clicks Score 23  -CH          Functional Assessment    Outcome Measure Options [facility] 6 Clicks Basic Mobility (PT)  -        User Key  (r) = Recorded By, (t) = Taken By, (c) = Cosigned By    Initials Name Provider Type    ZACHARY Leiva, PT Physical Therapist           Time Calculation:         PT Charges     Row Name 04/07/18 0940             Time Calculation    Start Time 0900  -CH      Stop Time 0910  -      Time Calculation (min) 10 min  -      PT Received On 04/07/18  -        User Key  (r) = Recorded By, (t) = Taken By, (c) = Cosigned By    Initials Name Provider Type    ZACHARY Leiva, PT Physical Therapist          Therapy Charges for Today     Code Description Service Date Service Provider Modifiers Qty    07568544683 HC PT MOBILITY CURRENT 4/7/2018 Ankur Leiva, PT GP, CI 1    55792984995 HC PT MOBILITY PROJECTED 4/7/2018 Ankur Leiva, PT GP, CI 1    93052949696 HC PT MOBILITY DISCHARGE 4/7/2018 Ankur Leiva, PT GP, CI 1    33030093043 HC PT EVAL LOW COMPLEXITY 2 4/7/2018 Ankur Leiva, PT GP 1    86736982145 HC PT THER PROC EA 15 MIN 4/7/2018 Ankur Leiva, PT GP 1          PT G-Codes  PT Professional Judgement Used?: Yes  Outcome Measure Options: -[facility] 6 Clicks Basic Mobility (PT)  Functional Limitation: Mobility: Walking and moving around  Mobility: Walking and Moving Around Current Status (): At least 1 percent but less than 20 percent impaired, limited or restricted  Mobility: Walking and Moving Around Goal Status (): At least 1 percent but less than 20 percent impaired, limited or restricted  Mobility: Walking and Moving Around Discharge Status (): At least 1 percent but less than 20 percent impaired, limited or restricted    PT Discharge Summary  Anticipated Discharge Disposition (PT): home or self care  Reason for Discharge: Independent, At baseline function, Maximum functional potential achieved  Outcomes Achieved: Able to achieve all goals within established timeline  Discharge  Destination: Home with assist    Ankur Leiva, PT  4/7/2018

## 2018-04-07 NOTE — PLAN OF CARE
Problem: Patient Care Overview  Goal: Plan of Care Review  Outcome: Ongoing (interventions implemented as appropriate)   04/07/18 0440   Coping/Psychosocial   Plan of Care Reviewed With patient   Plan of Care Review   Progress improving   OTHER   Outcome Summary admitted patient to unit, vss, ns @100, cardiology consulted, resting well throughout shift, will continue to monitor       Problem: Fall Risk (Adult)  Goal: Identify Related Risk Factors and Signs and Symptoms  Outcome: Ongoing (interventions implemented as appropriate)      Problem: Pain, Acute (Adult)  Goal: Identify Related Risk Factors and Signs and Symptoms  Outcome: Ongoing (interventions implemented as appropriate)

## 2018-04-08 VITALS
RESPIRATION RATE: 18 BRPM | OXYGEN SATURATION: 96 % | WEIGHT: 155 LBS | TEMPERATURE: 97.7 F | DIASTOLIC BLOOD PRESSURE: 73 MMHG | BODY MASS INDEX: 27.46 KG/M2 | HEART RATE: 61 BPM | SYSTOLIC BLOOD PRESSURE: 114 MMHG | HEIGHT: 63 IN

## 2018-04-08 PROBLEM — I20.0 UNSTABLE ANGINA (HCC): Status: ACTIVE | Noted: 2018-04-06

## 2018-04-08 LAB
ANION GAP SERPL CALCULATED.3IONS-SCNC: 9.5 MMOL/L
BASOPHILS # BLD AUTO: 0.01 10*3/MM3 (ref 0–0.2)
BASOPHILS NFR BLD AUTO: 0.1 % (ref 0–1.5)
BUN BLD-MCNC: 24 MG/DL (ref 8–23)
BUN/CREAT SERPL: 30.8 (ref 7–25)
CALCIUM SPEC-SCNC: 8.9 MG/DL (ref 8.6–10.5)
CHLORIDE SERPL-SCNC: 106 MMOL/L (ref 98–107)
CO2 SERPL-SCNC: 24.5 MMOL/L (ref 22–29)
CREAT BLD-MCNC: 0.78 MG/DL (ref 0.76–1.27)
DEPRECATED RDW RBC AUTO: 50.1 FL (ref 37–54)
EOSINOPHIL # BLD AUTO: 0.08 10*3/MM3 (ref 0–0.7)
EOSINOPHIL NFR BLD AUTO: 0.5 % (ref 0.3–6.2)
ERYTHROCYTE [DISTWIDTH] IN BLOOD BY AUTOMATED COUNT: 14.9 % (ref 11.5–14.5)
GFR SERPL CREATININE-BSD FRML MDRD: 99 ML/MIN/1.73
GLUCOSE BLD-MCNC: 133 MG/DL (ref 65–99)
HCT VFR BLD AUTO: 40.2 % (ref 40.4–52.2)
HGB BLD-MCNC: 12.9 G/DL (ref 13.7–17.6)
IMM GRANULOCYTES # BLD: 0.07 10*3/MM3 (ref 0–0.03)
IMM GRANULOCYTES NFR BLD: 0.4 % (ref 0–0.5)
LYMPHOCYTES # BLD AUTO: 2.42 10*3/MM3 (ref 0.9–4.8)
LYMPHOCYTES NFR BLD AUTO: 14.8 % (ref 19.6–45.3)
MCH RBC QN AUTO: 29.6 PG (ref 27–32.7)
MCHC RBC AUTO-ENTMCNC: 32.1 G/DL (ref 32.6–36.4)
MCV RBC AUTO: 92.2 FL (ref 79.8–96.2)
MONOCYTES # BLD AUTO: 1.17 10*3/MM3 (ref 0.2–1.2)
MONOCYTES NFR BLD AUTO: 7.1 % (ref 5–12)
NEUTROPHILS # BLD AUTO: 12.63 10*3/MM3 (ref 1.9–8.1)
NEUTROPHILS NFR BLD AUTO: 77.1 % (ref 42.7–76)
PLATELET # BLD AUTO: 274 10*3/MM3 (ref 140–500)
PMV BLD AUTO: 10.6 FL (ref 6–12)
POTASSIUM BLD-SCNC: 4.7 MMOL/L (ref 3.5–5.2)
RBC # BLD AUTO: 4.36 10*6/MM3 (ref 4.6–6)
SODIUM BLD-SCNC: 140 MMOL/L (ref 136–145)
TROPONIN T SERPL-MCNC: <0.01 NG/ML (ref 0–0.03)
WBC NRBC COR # BLD: 16.38 10*3/MM3 (ref 4.5–10.7)

## 2018-04-08 PROCEDURE — 99239 HOSP IP/OBS DSCHRG MGMT >30: CPT | Performed by: INTERNAL MEDICINE

## 2018-04-08 PROCEDURE — 80048 BASIC METABOLIC PNL TOTAL CA: CPT | Performed by: INTERNAL MEDICINE

## 2018-04-08 PROCEDURE — 85025 COMPLETE CBC W/AUTO DIFF WBC: CPT | Performed by: INTERNAL MEDICINE

## 2018-04-08 PROCEDURE — 93458 L HRT ARTERY/VENTRICLE ANGIO: CPT | Performed by: INTERNAL MEDICINE

## 2018-04-08 PROCEDURE — 0 IOPAMIDOL PER 1 ML: Performed by: INTERNAL MEDICINE

## 2018-04-08 PROCEDURE — B2111ZZ FLUOROSCOPY OF MULTIPLE CORONARY ARTERIES USING LOW OSMOLAR CONTRAST: ICD-10-PCS | Performed by: INTERNAL MEDICINE

## 2018-04-08 PROCEDURE — 25010000002 HEPARIN (PORCINE) PER 1000 UNITS: Performed by: INTERNAL MEDICINE

## 2018-04-08 PROCEDURE — 25010000002 FENTANYL CITRATE (PF) 100 MCG/2ML SOLUTION: Performed by: INTERNAL MEDICINE

## 2018-04-08 PROCEDURE — C1894 INTRO/SHEATH, NON-LASER: HCPCS | Performed by: INTERNAL MEDICINE

## 2018-04-08 PROCEDURE — 25010000002 MIDAZOLAM PER 1 MG: Performed by: INTERNAL MEDICINE

## 2018-04-08 PROCEDURE — 4A023N7 MEASUREMENT OF CARDIAC SAMPLING AND PRESSURE, LEFT HEART, PERCUTANEOUS APPROACH: ICD-10-PCS | Performed by: INTERNAL MEDICINE

## 2018-04-08 PROCEDURE — 84484 ASSAY OF TROPONIN QUANT: CPT | Performed by: INTERNAL MEDICINE

## 2018-04-08 PROCEDURE — B2151ZZ FLUOROSCOPY OF LEFT HEART USING LOW OSMOLAR CONTRAST: ICD-10-PCS | Performed by: INTERNAL MEDICINE

## 2018-04-08 PROCEDURE — C1769 GUIDE WIRE: HCPCS | Performed by: INTERNAL MEDICINE

## 2018-04-08 RX ORDER — LIDOCAINE HYDROCHLORIDE 20 MG/ML
INJECTION, SOLUTION INFILTRATION; PERINEURAL AS NEEDED
Status: DISCONTINUED | OUTPATIENT
Start: 2018-04-08 | End: 2018-04-08 | Stop reason: HOSPADM

## 2018-04-08 RX ORDER — SODIUM CHLORIDE 9 MG/ML
100 INJECTION, SOLUTION INTRAVENOUS CONTINUOUS
Status: DISCONTINUED | OUTPATIENT
Start: 2018-04-08 | End: 2018-04-08 | Stop reason: HOSPADM

## 2018-04-08 RX ORDER — FENTANYL CITRATE 50 UG/ML
INJECTION, SOLUTION INTRAMUSCULAR; INTRAVENOUS AS NEEDED
Status: DISCONTINUED | OUTPATIENT
Start: 2018-04-08 | End: 2018-04-08 | Stop reason: HOSPADM

## 2018-04-08 RX ORDER — MIDAZOLAM HYDROCHLORIDE 1 MG/ML
INJECTION INTRAMUSCULAR; INTRAVENOUS AS NEEDED
Status: DISCONTINUED | OUTPATIENT
Start: 2018-04-08 | End: 2018-04-08 | Stop reason: HOSPADM

## 2018-04-08 RX ORDER — SODIUM CHLORIDE 9 MG/ML
INJECTION, SOLUTION INTRAVENOUS CONTINUOUS PRN
Status: DISCONTINUED | OUTPATIENT
Start: 2018-04-08 | End: 2018-04-08 | Stop reason: HOSPADM

## 2018-04-08 RX ADMIN — ATORVASTATIN CALCIUM 20 MG: 20 TABLET, FILM COATED ORAL at 08:14

## 2018-04-08 RX ADMIN — ASPIRIN 81 MG: 81 TABLET ORAL at 08:14

## 2018-04-08 RX ADMIN — METOPROLOL SUCCINATE 50 MG: 50 TABLET, FILM COATED, EXTENDED RELEASE ORAL at 08:14

## 2018-04-08 RX ADMIN — AMLODIPINE BESYLATE 5 MG: 5 TABLET ORAL at 08:13

## 2018-04-08 NOTE — DISCHARGE SUMMARY
Date of Admission: 4/6/2018    Date of Discharge:  4/8/2018    Discharge Diagnoses:   1. Chest pain   2. CAD status post 3 stents to RCA and stent to LAD on 4/28/15  3. Ongoing tobacco use  4. Hypertension  5. Leukoctosis      Procedures Performed:  1. CTA of chest on 4/6/2018  2. Left heart catheterization on 4/8/2018 by Dr. Carlton.       Hospital Course:     This is a very pleasant 67 year-old white male who I normally follow in the office.  He has a history of coronary artery disease, and underwent 3 stents to his RCA and a stent to his LAD on 4/28/2015. He presented after several episodes of severe chest discomfort which she described as a pressure sensation in the center of his chest which did not radiate.  He did get some relief from sublingual nitroglycerin.  In the emergency department, his troponin remained negative, although his white blood cell count was elevated at 17.82.  He was initially admitted to [facility], and a viral panel and urinalysis were unrevealing.  He does have blood cultures pending, although he did not have any signs of infection.  He did have a CT angiogram of the chest in the emergency department as well on 4/6/2018 which did not show any evidence of pulmonary embolism or acute pathology.     It was felt that his chest discomfort might represent unstable angina as he continues to smoke.  He ultimately underwent a diagnostic left heart catheterization on 4/8/2018 by Dr. Carlton.  This showed widely patent stents in LAD and RCA.  Otherwise, the most significant lesion was a distal circumflex which had a 50% focal lesion.  Was felt that medical management could be continued.  His right radial access site looked good without evidence of vascular injury.  He was advised to quit smoking, which we have discussed many times previously.    Discharge Medications:   David Austin Jr.   Home Medication Instructions FABIEN:[id number]    Printed on:04/08/18 6964   Medication Information                       amLODIPine (NORVASC) 5 MG tablet  Take 5 mg by mouth Daily.             Ascorbic Acid (VITAMIN C) 500 MG capsule  Take 500 mg by mouth Daily.             aspirin 81 MG EC tablet  Take 81 mg by mouth Daily.             atorvastatin (LIPITOR) 20 MG tablet  Take 20 mg by mouth Daily.             HYDROcodone-acetaminophen (NORCO) 7.5-325 MG per tablet  Take 1 tablet by mouth 4 (Four) Times a Day As Needed for Moderate Pain .             metoprolol succinate XL (TOPROL-XL) 50 MG 24 hr tablet  Take 50 mg by mouth Daily.             nitroglycerin (NITROSTAT) 0.4 MG SL tablet  Place 0.4 mg under the tongue Every 5 (Five) Minutes As Needed for Chest Pain. Take no more than 3 doses in 15 minutes.               Physical Exam:   General Appearance:    No acute distress, alert and oriented x4   Lungs:     Clear to auscultation bilaterally     Heart:    Regular rhythm and normal rate.  No murmurs, gallops, or       rubs.   Abdomen:     Soft, non-tender, non-distended.    Extremities:   Moves all extremities well.  No clubbing, cyanosis, or edema.       Follow-up:  1. Follow-up with me in 6 weeks.      Time Spent on Discharge: Greater than 30 minutes       Barak Magallon MD  04/08/18  12:52 PM

## 2018-04-08 NOTE — PLAN OF CARE
Problem: Patient Care Overview  Goal: Plan of Care Review  Outcome: Ongoing (interventions implemented as appropriate)   04/08/18 0430   Coping/Psychosocial   Plan of Care Reviewed With patient   Plan of Care Review   Progress improving   OTHER   Outcome Summary Pt NPO since MN for cardiac cath at 0900 this AM. No c/o pain or SOA. Pt ambulates frequently independently. VSS. Awaiting AM labs. Will continue to monitor.     Goal: Individualization and Mutuality  Outcome: Ongoing (interventions implemented as appropriate)    Goal: Discharge Needs Assessment  Outcome: Ongoing (interventions implemented as appropriate)      Problem: Fall Risk (Adult)  Goal: Absence of Fall  Outcome: Ongoing (interventions implemented as appropriate)      Problem: Pain, Acute (Adult)  Goal: Acceptable Pain Control/Comfort Level  Outcome: Ongoing (interventions implemented as appropriate)

## 2018-04-11 LAB
BACTERIA SPEC AEROBE CULT: NORMAL
BACTERIA SPEC AEROBE CULT: NORMAL

## 2018-07-12 ENCOUNTER — OFFICE VISIT (OUTPATIENT)
Dept: CARDIOLOGY | Facility: CLINIC | Age: 68
End: 2018-07-12

## 2018-07-12 VITALS
HEIGHT: 63 IN | WEIGHT: 165.2 LBS | HEART RATE: 65 BPM | SYSTOLIC BLOOD PRESSURE: 150 MMHG | DIASTOLIC BLOOD PRESSURE: 84 MMHG | OXYGEN SATURATION: 95 % | BODY MASS INDEX: 29.27 KG/M2

## 2018-07-12 DIAGNOSIS — I10 ESSENTIAL HYPERTENSION: ICD-10-CM

## 2018-07-12 DIAGNOSIS — I25.10 CORONARY ARTERY DISEASE INVOLVING NATIVE CORONARY ARTERY OF NATIVE HEART WITHOUT ANGINA PECTORIS: Primary | ICD-10-CM

## 2018-07-12 PROCEDURE — 99213 OFFICE O/P EST LOW 20 MIN: CPT | Performed by: INTERNAL MEDICINE

## 2018-07-12 RX ORDER — BUDESONIDE AND FORMOTEROL FUMARATE DIHYDRATE 160; 4.5 UG/1; UG/1
2 AEROSOL RESPIRATORY (INHALATION) DAILY PRN
COMMUNITY

## 2018-07-29 NOTE — PROGRESS NOTES
Date of Office Visit: 2018  Encounter Provider: Barak Magallon MD  Place of Service: [facility]  Patient Name: David Austin Jr.  :1950    Chief complaint: Follow-up for coronary artery disease.     History of Present Illness:    Dear Dr. Roman:      I again had the pleasure of seeing your patient in cardiology office on 2018.  As  you well known, he is a very pleasant 67 year-old white male with a past medical history  significant for coronary artery disease, hypertension, and hyperlipidemia who presents   for follow-up. The patient was first evaluated in our chest pain unit in the office on   2015. At that time, he had been experiencing exertional chest pressure   accompanied by significant dyspnea on exertion for approximately 1 month. His   symptoms were very concerning for unstable angina based on his description and the   nature of the progression of the symptoms. He subsequently underwent a diagnostic   left hear catheterization on 2015 by Dr. Lau. At that time, he was found to have   very significant coronary artery disease. His LAD was large and had a 70% lesion   followed by an 80% lesion in the mid segment. The right coronary artery was also   large and dominant and had diffuse 70% mid disease, as well as a 90% focal distal   stenosis followed by a 95% distal focal stenosis as well. He subsequently underwent   placement of a 3.25 x 38 mm Xience Alpine drug eluting stent overlapping with a 3.0 x   8 mm Xience Alpine drug eluting stent to cover both distal right coronary artery lesions.   He also had a placement of a 4.0 x 38 mm Xience drug eluting stent to the mid-right   coronary artery which overlapped with the distal stents. He also had placement of a   3.25 x 33 mm Xience Alpine drug eluting stent to the mid LAD at that time as well.    The patient was admitted on 2018 with several episodes of chest pressure which  "  was similar to his previous angina.  A CT angiogram of his chest on 4/6/2018 showed   no evidence for pulmonary emboli.  He did undergo a repeat diagnostic left heart   catheterization on 4/8/2018 which showed widely patent stents in his mid LAD and   throughout his RCA.  Otherwise, the most significant lesion was a distal left circumflex   which had a 50% focal lesion.  Medical therapy was recommended at that time.     The patient presents today for follow-up.  He is doing better, and has not had any chest   discomfort.  Unfortunately, he is still smoking.  He denied any shortness of breath with   exertion.  His blood pressure is elevated today at 150/84.  He evidently has not been on   his amlodipine at home as he stated that it was causing \"dizziness\".    Past Medical History:   Diagnosis Date   • Chronic back pain    • Coronary artery disease 04/28/2015    s/p 3 CARMEN to RCA and CARMEN to mid LAD on 4/28/15   • Hyperlipidemia    • Hypertension        Past Surgical History:   Procedure Laterality Date   • APPENDECTOMY     • BACK SURGERY      x2   • CARDIAC CATHETERIZATION     • CARDIAC CATHETERIZATION N/A 4/8/2018    Procedure: Left Heart Cath;  Surgeon: Jose Carlton MD;  Location: [facility] CATH INVASIVE LOCATION;  Service: Cardiovascular   • CARDIAC CATHETERIZATION N/A 4/8/2018    Procedure: Coronary angiography;  Surgeon: Jose Carlton MD;  [facility] C[facility];  Service: Cardiovascular   • CARDIAC CATHETERIZATION N/A 4/8/2018    Procedure: Left ventriculography;  Surgeon: Jose Carlton MD;  Location: [facility] CATH INVASIVE LOCATION;  Service: Cardiovascular   • CORONARY ANGIOPLASTY  04/28/2015    A) Xience Alpine drug eluting stent [id number] overlapping with 3.0x8mm Xience alpine drug eluting stent to cover distal RCA . B) 4.0 x 38mm Xience Alpine drug eluting stent cover the mid RCA which overlaps with the distal stents. C) 3.25 x 33mm Xience Alpine drug eluting stent to mid LAD.    • SHOULDER SURGERY   "    right    • VASECTOMY         Current Outpatient Prescriptions on File Prior to Visit   Medication Sig Dispense Refill   • Ascorbic Acid (VITAMIN C) 500 MG capsule Take 500 mg by mouth Daily.     • aspirin 81 MG EC tablet Take 81 mg by mouth Daily.     • atorvastatin (LIPITOR) 20 MG tablet Take 20 mg by mouth Daily.     • HYDROcodone-acetaminophen (NORCO) 7.5-325 MG per tablet Take 1 tablet by mouth 4 (Four) Times a Day As Needed for Moderate Pain .     • metoprolol succinate XL (TOPROL-XL) 50 MG 24 hr tablet Take 50 mg by mouth Daily.     • nitroglycerin (NITROSTAT) 0.4 MG SL tablet Place 0.4 mg under the tongue Every 5 (Five) Minutes As Needed for Chest Pain. Take no more than 3 doses in 15 minutes.     • amLODIPine (NORVASC) 5 MG tablet Take 5 mg by mouth Daily.       No current facility-administered medications on file prior to visit.      Allergies as of 07/12/2018 - Reviewed 04/06/2018   Allergen Reaction Noted   • No known drug allergy  07/02/2016   • Relafen [nabumetone] Rash 04/06/2018     Social History     Social History   • Marital status:      Spouse name: N/A   • Number of children: N/A   • Years of education: N/A     Occupational History   • Not on file.     Social History Main Topics   • Smoking status: Current Every Day Smoker     Packs/day: 3.00     Years: 50.00     Types: Cigarettes   • Smokeless tobacco: Never Used      Comment: Smoked 2-3 ppd for 50 years.  Quit at time of stents in 4/15.   • Alcohol use No   • Drug use: No   • Sexual activity: Not on file     Other Topics Concern   • Not on file     Social History Narrative   • No narrative on file     Family History   Problem Relation Age of Onset   • Coronary artery disease Neg Hx        Review of Systems   Constitution: Positive for malaise/fatigue.   Neurological: Positive for light-headedness.   All other systems reviewed and are negative.     Objective:     Vitals:    07/12/18 1316   BP: 150/84   Pulse: 65   SpO2: 95%   Weight:  "74.9 kg (165 lb 3.2 oz)   Height: 160 cm (62.99\")     Body mass index is 29.27 kg/m².    Physical Exam   Constitutional: He is oriented to person, place, and time. He appears well-developed and well-nourished.   HENT:   Head: Normocephalic and atraumatic.   Eyes: Conjunctivae are normal.   Neck: Neck supple.   Cardiovascular: Normal rate and regular rhythm.  Exam reveals no gallop and no friction rub.    No murmur heard.  Pulmonary/Chest: Effort normal and breath sounds normal.   Abdominal: Soft. There is no tenderness.   Musculoskeletal: He exhibits no edema.   Neurological: He is alert and oriented to person, place, and time.   Skin: Skin is warm.   Psychiatric: He has a normal mood and affect. His behavior is normal.     Lab Review:   Procedures    Cardiac Procedures:  1. Left heart catheterization on 4/28/2015: The left main coronary artery was normal. The left   circumflex had 30% diffuse mid disease and 20% to 30% distal disease. The LAD was large   and had a 70% to 80% focal mid-stenosis. The right coronary artery was dominant and had   diffuse 70% mid disease followed by a 90% focal distal stenosis which was then followed by   another 95% more distal stenosis.   a. Status post placement of a 3.25 x 38 mm Xience Alpine drug eluting stent overlapping with   a 3.0 x 8 mm Xience Alpine drug eluting stent to cover the distal right coronary artery lesion.   b. Status post placement of a 4.0 x 38 mm Xience Alpine drug eluting stent to the mid right   coronary artery which overlaps with the distal stents.   c. Status post placement of a 3.25 x 33 mm Xience Alpine drug eluting stent to the mid LAD.   2.  CT angiogram of the chest on 4/6/2018: No pulmonary embolism or acute pathology.  3.  Left heart catheterization on 4/8/2018 by Dr. Carlton: The left main was normal.  LAD had 20%   diffuse proximal disease.  The mid LAD stent was widely patent.  OM1 had a 40% proximal   lesion.  The distal left circumflex had 50% " disease.  Stents throughout the RCA were patent   with no in-stent restenosis.  The PDA had 30% diffuse disease.    Assessment:       Diagnosis Plan   1. Coronary artery disease involving native coronary artery of native heart without angina pectoris     2. Essential hypertension       Plan:       For coronary artery disease standpoint, the patient seems to be stable.  He has not had further   chest discomfort.  His cardiac catheterization in April 2018 showed widely patent stents in the   LAD and RCA.  He had up to 50% disease in the distal left circumflex, and up to 40% disease   in the proximal OM1 branch.  His blood pressure is elevated, and he is off of amlodipine for   unclear reasons.  He evidently stopped this on his own secondary to dizziness.  However, he   states that this may not have actually been the cause.  He had previously been on 5 mg per   day, and I have asked him to restart the amlodipine at 2.5 mg per day and watch his blood   pressure.  He will continue on the Toprol-XL at 75 mg per day.  He will also continue on   aspirin and Lipitor.  I have discussed smoking cessation with him on multiple occasions, and   reemphasized this again today.  However, I do not feel that he is ready to quit.  I will see him   back in the office in 6 months.    Coronary Artery Disease  Assessment  • The patient has no angina    Plan  • Lifestyle modifications discussed include adhering to a heart healthy diet, avoidance of tobacco products, maintenance of a healthy weight, medication compliance, regular exercise and regular monitoring of cholesterol and blood pressure    Subjective - Objective  • There has been a previous stent procedure using CARMEN on or around 4/28/2015  • Current antiplatelet therapy includes aspirin 81 mg

## 2019-01-17 ENCOUNTER — OFFICE VISIT (OUTPATIENT)
Dept: CARDIOLOGY | Facility: CLINIC | Age: 69
End: 2019-01-17

## 2019-01-17 VITALS
BODY MASS INDEX: 31.18 KG/M2 | OXYGEN SATURATION: 98 % | DIASTOLIC BLOOD PRESSURE: 66 MMHG | WEIGHT: 176 LBS | HEART RATE: 86 BPM | SYSTOLIC BLOOD PRESSURE: 130 MMHG

## 2019-01-17 DIAGNOSIS — I25.10 CORONARY ARTERY DISEASE INVOLVING NATIVE CORONARY ARTERY OF NATIVE HEART WITHOUT ANGINA PECTORIS: Primary | ICD-10-CM

## 2019-01-17 DIAGNOSIS — I10 ESSENTIAL HYPERTENSION: ICD-10-CM

## 2019-01-17 PROCEDURE — 99213 OFFICE O/P EST LOW 20 MIN: CPT | Performed by: INTERNAL MEDICINE

## 2019-01-17 RX ORDER — PREDNISONE 1 MG/1
5 TABLET ORAL DAILY
COMMUNITY
End: 2019-09-17

## 2019-01-17 RX ORDER — NITROGLYCERIN 0.4 MG/1
0.4 TABLET SUBLINGUAL
Qty: 25 TABLET | Refills: 6 | Status: SHIPPED | OUTPATIENT
Start: 2019-01-17 | End: 2019-09-17 | Stop reason: SDUPTHER

## 2019-01-17 RX ORDER — LEVOFLOXACIN 500 MG/1
500 TABLET, FILM COATED ORAL DAILY
COMMUNITY
End: 2019-09-17

## 2019-01-27 NOTE — PROGRESS NOTES
Date of Office Visit: 2019  Encounter Provider: Barak Magallon MD  Place of Service: [facility] CARDIOLOGY  Patient Name: David Austin Jr.  :1950    Chief complaint: Follow-up for coronary artery disease.    History of Present Illness:    Dear Dr. Roman:      I again had the pleasure of seeing your patient in cardiology office on 2019.  As  you well know, he is a very pleasant 68 year-old white male with a past medical history  significant for coronary artery disease, hypertension, and hyperlipidemia who presents   for follow-up. The patient was first evaluated in our chest pain unit in the office on   2015. At that time, he had been experiencing exertional chest pressure   accompanied by significant dyspnea on exertion for approximately 1 month. His   symptoms were very concerning for unstable angina based on his description and the   nature of the progression of the symptoms. He subsequently underwent a diagnostic   left hear catheterization on 2015 by Dr. Lau. At that time, he was found to have   very significant coronary artery disease. His LAD was large and had a 70% lesion   followed by an 80% lesion in the mid segment. The right coronary artery was also   large and dominant and had diffuse 70% mid disease, as well as a 90% focal distal   stenosis followed by a 95% distal focal stenosis as well. He subsequently underwent   placement of a 3.25 x 38 mm Xience Alpine drug eluting stent overlapping with a 3.0 x   8 mm Xience Alpine drug eluting stent to cover both distal right coronary artery lesions.   He also had a placement of a 4.0 x 38 mm Xience drug eluting stent to the mid-right   coronary artery which overlapped with the distal stents. He also had placement of a   3.25 x 33 mm Xience Alpine drug eluting stent to the mid LAD at that time as well.     The patient was admitted on 2018 with several episodes of chest pressure which    was similar to his previous angina.  A CT angiogram of his chest on 4/6/2018 showed   no evidence for pulmonary emboli.  He did undergo a repeat diagnostic left heart   catheterization on 4/8/2018 which showed widely patent stents in his mid LAD and   throughout his RCA.  Otherwise, the most significant lesion was a distal left circumflex   which had a 50% focal lesion.  Medical therapy was recommended at that time.     The patient presents today for follow-up.  He denied any chest pain or shortness of   breath.  Unfortunately, he is still smoking.  His blood pressure was high recently at his   primary care physician's office, and he is going to have this rechecked on 1/23/2018.         Past Medical History:   Diagnosis Date   • Chronic back pain    • Coronary artery disease 04/28/2015    s/p 3 CARMEN to RCA and CARMEN to mid LAD on 4/28/15   • Hyperlipidemia    • Hypertension        Past Surgical History:   Procedure Laterality Date   • APPENDECTOMY     • BACK SURGERY      x2   • CARDIAC CATHETERIZATION     • CARDIAC CATHETERIZATION N/A 4/8/2018    Procedure: Left Heart Cath;  Surgeon: Jose Carlton MD;  Location: [facility] CATH INVASIVE LOCATION;  Service: Cardiovascular   • CARDIAC CATHETERIZATION N/A 4/8/2018    Procedure: Coronary angiography;  Surgeon: Jose Carlton MD;  [facility];  Service: Cardiovascular   • CARDIAC CATHETERIZATION N/A 4/8/2018    Procedure: Left ventriculography;  Surgeon: Jose Carlton MD;  Location: [facility] CATH INVASIVE LOCATION;  Service: Cardiovascular   • CORONARY ANGIOPLASTY  04/28/2015    A) Xience Alpine drug eluting stent [id number] overlapping with 3.0x8mm Xience alpine drug eluting stent to cover distal RCA . B) 4.0 x 38mm Xience Alpine drug eluting stent cover the mid RCA which overlaps with the distal stents. C) 3.25 x 33mm Xience Alpine drug eluting stent to mid LAD.    • SHOULDER SURGERY      right    • VASECTOMY         Current Outpatient Medications on File  Prior to Visit   Medication Sig Dispense Refill   • amLODIPine (NORVASC) 5 MG tablet Take 5 mg by mouth Daily.     • Ascorbic Acid (VITAMIN C) 500 MG capsule Take 500 mg by mouth Daily.     • aspirin 81 MG EC tablet Take 81 mg by mouth Daily.     • atorvastatin (LIPITOR) 20 MG tablet Take 20 mg by mouth Daily.     • budesonide-formoterol (SYMBICORT) 160-4.5 MCG/ACT inhaler Symbicort 160 mcg-4.5 mcg/actuation HFA aerosol inhaler     • HYDROcodone-acetaminophen (NORCO) 7.5-325 MG per tablet Take 1 tablet by mouth 4 (Four) Times a Day As Needed for Moderate Pain .     • levoFLOXacin (LEVAQUIN) 500 MG tablet Take 500 mg by mouth Daily.     • metoprolol succinate XL (TOPROL-XL) 50 MG 24 hr tablet Take 50 mg by mouth Daily.     • predniSONE (DELTASONE) 5 MG tablet Take 5 mg by mouth Daily.       No current facility-administered medications on file prior to visit.      Allergies as of 01/17/2019 - Reviewed 01/17/2019   Allergen Reaction Noted   • No known drug allergy  07/02/2016   • Relafen [nabumetone] Rash 04/06/2018     Social History     Socioeconomic History   • Marital status:      Spouse name: Not on file   • Number of children: Not on file   • Years of education: Not on file   • Highest education level: Not on file   Social Needs   • Financial resource strain: Not on file   • Food insecurity - worry: Not on file   • Food insecurity - inability: Not on file   • Transportation needs - medical: Not on file   • Transportation needs - non-medical: Not on file   Occupational History   • Not on file   Tobacco Use   • Smoking status: Current Every Day Smoker     Packs/day: 3.00     Years: 50.00     Pack years: 150.00     Types: Cigarettes   • Smokeless tobacco: Never Used   • Tobacco comment: Smoked 2-3 ppd for 50 years.  Quit at time of stents in 4/15.   Substance and Sexual Activity   • Alcohol use: No   • Drug use: No   • Sexual activity: Not on file   Other Topics Concern   • Not on file   Social History  Narrative   • Not on file     Family History   Problem Relation Age of Onset   • Coronary artery disease Neg Hx        Review of Systems   All other systems reviewed and are negative.     Objective:     Vitals:    01/17/19 1121   BP: 130/66   BP Location: Left arm   Pulse: 86   SpO2: 98%   Weight: 79.8 kg (176 lb)     Body mass index is 31.18 kg/m².    Physical Exam   Constitutional: He is oriented to person, place, and time. He appears well-developed and well-nourished.   HENT:   Head: Normocephalic and atraumatic.   Eyes: Conjunctivae are normal.   Neck: Neck supple.   Cardiovascular: Normal rate and regular rhythm. Exam reveals no gallop and no friction rub.   No murmur heard.  Pulmonary/Chest: Effort normal and breath sounds normal.   Abdominal: Soft. There is no tenderness.   Musculoskeletal: He exhibits no edema.   Neurological: He is alert and oriented to person, place, and time.   Skin: Skin is warm.   Psychiatric: He has a normal mood and affect. His behavior is normal.     Lab Review:   Procedures    Cardiac Procedures:  1. Left heart catheterization on 4/28/2015: The left main coronary artery was normal. The left   circumflex had 30% diffuse mid disease and 20% to 30% distal disease. The LAD was large   and had a 70% to 80% focal mid-stenosis. The right coronary artery was dominant and had   diffuse 70% mid disease followed by a 90% focal distal stenosis which was then followed by   another 95% more distal stenosis.   a. Status post placement of a 3.25 x 38 mm Xience Alpine drug eluting stent overlapping with   a 3.0 x 8 mm Xience Alpine drug eluting stent to cover the distal right coronary artery lesion.   b. Status post placement of a 4.0 x 38 mm Xience Alpine drug eluting stent to the mid right   coronary artery which overlaps with the distal stents.   c. Status post placement of a 3.25 x 33 mm Xience Alpine drug eluting stent to the mid LAD.   2.  CT angiogram of the chest on 4/6/2018: No pulmonary  embolism or acute pathology.  3.  Left heart catheterization on 4/8/2018 by Dr. Carlton: The left main was normal.  LAD had 20%   diffuse proximal disease.  The mid LAD stent was widely patent.  OM1 had a 40% proximal   lesion.  The distal left circumflex had 50% disease.  Stents throughout the RCA were patent   with no in-stent restenosis.  The PDA had 30% diffuse disease.      Assessment:       Diagnosis Plan   1. Coronary artery disease involving native coronary artery of native heart without angina pectoris     2. Essential hypertension       Plan:       The patient seems to be stable from a cardiac standpoint.  He has had no chest pain.    Unfortunately, he continues to smoke.  We have discussed smoking cessation on multiple   occasions, and I again reemphasized the importance today.  I do not feel he is ready to quit.    His blood pressure recently was elevated to his primary care physician's office, although   this is being rechecked on 1/23/2019.  Today, his blood pressure is reasonable.  He is   currently on Toprol-XL 50 mg per day, Lipitor 20 mg per day, and amlodipine 5 mg per day.    He will continue on aspirin for life.  I did send in fresh nitroglycerin for him to take on an   as-needed basis.  I did not change any other medications.  I will see him back in 8 months.    Coronary Artery Disease  Assessment  • The patient has no angina    Plan  • Lifestyle modifications discussed include adhering to a heart healthy diet, avoidance of tobacco products, maintenance of a healthy weight, medication compliance, regular exercise and regular monitoring of cholesterol and blood pressure    Subjective - Objective  • There has been a previous stent procedure using CARMEN on or around 4/28/2015  • Current antiplatelet therapy includes aspirin 81 mg

## 2019-09-17 ENCOUNTER — OFFICE VISIT (OUTPATIENT)
Dept: CARDIOLOGY | Facility: CLINIC | Age: 69
End: 2019-09-17

## 2019-09-17 VITALS
BODY MASS INDEX: 31.18 KG/M2 | DIASTOLIC BLOOD PRESSURE: 82 MMHG | HEART RATE: 82 BPM | WEIGHT: 176 LBS | SYSTOLIC BLOOD PRESSURE: 128 MMHG | HEIGHT: 63 IN | OXYGEN SATURATION: 95 %

## 2019-09-17 DIAGNOSIS — I73.9 CLAUDICATION (HCC): Primary | ICD-10-CM

## 2019-09-17 DIAGNOSIS — I25.10 CORONARY ARTERY DISEASE INVOLVING NATIVE CORONARY ARTERY OF NATIVE HEART WITHOUT ANGINA PECTORIS: ICD-10-CM

## 2019-09-17 DIAGNOSIS — I10 ESSENTIAL HYPERTENSION: ICD-10-CM

## 2019-09-17 PROCEDURE — 99214 OFFICE O/P EST MOD 30 MIN: CPT | Performed by: INTERNAL MEDICINE

## 2019-09-17 RX ORDER — NITROGLYCERIN 0.4 MG/1
0.4 TABLET SUBLINGUAL
Qty: 25 TABLET | Refills: 6 | Status: ON HOLD | OUTPATIENT
Start: 2019-09-17 | End: 2020-03-06 | Stop reason: SDUPTHER

## 2019-09-23 ENCOUNTER — HOSPITAL ENCOUNTER (OUTPATIENT)
Dept: CARDIOLOGY | Facility: HOSPITAL | Age: 69
Discharge: HOME | End: 2019-09-23
Admitting: INTERNAL MEDICINE

## 2019-09-23 DIAGNOSIS — I73.9 CLAUDICATION (HCC): ICD-10-CM

## 2019-09-23 PROCEDURE — 93923 UPR/LXTR ART STDY 3+ LVLS: CPT

## 2019-09-23 PROCEDURE — 93923 UPR/LXTR ART STDY 3+ LVLS: CPT | Performed by: INTERNAL MEDICINE

## 2019-09-24 LAB
BH CV LOWER ARTERIAL LEFT ABI RATIO: 1.25
BH CV LOWER ARTERIAL LEFT CALF RATIO: 1.28
BH CV LOWER ARTERIAL LEFT HIGH THIGH SYS MAX: 156 MMHG
BH CV LOWER ARTERIAL LEFT LOW THIGH SYS MAX: 166 MMHG
BH CV LOWER ARTERIAL LEFT POPLITEAL SYS MAX: 160 MMHG
BH CV LOWER ARTERIAL LEFT POST TIBIAL SYS MAX: 156 MMHG
BH CV LOWER ARTERIAL RIGHT ABI RATIO: 1.23
BH CV LOWER ARTERIAL RIGHT CALF RATIO: 1.34
BH CV LOWER ARTERIAL RIGHT HIGH THIGH SYS MAX: 170 MMHG
BH CV LOWER ARTERIAL RIGHT LOW THIGH SYS MAX: 174 MMHG
BH CV LOWER ARTERIAL RIGHT POPLITEAL SYS MAX: 168 MMHG
BH CV LOWER ARTERIAL RIGHT POST TIBIAL SYS MAX: 154 MMHG
UPPER ARTERIAL LEFT ARM BRACHIAL SYS MAX: 125 MMHG
UPPER ARTERIAL RIGHT ARM BRACHIAL SYS MAX: 124 MMHG

## 2019-10-12 NOTE — PROGRESS NOTES
Date of Office Visit: 2019  Encounter Provider: Barak Magallon MD  Place of Service: [facility] CARDIOLOGY  Patient Name: David Austin Jr.  :1950    Chief complaint: Follow-up for coronary artery disease, hypertension.    History of Present Illness:    Dear Dr. Roman:      I again had the pleasure of seeing your patient in cardiology office on 2019.  As  you well know, he is a very pleasant 68 year-old white male with a past medical history  significant for coronary artery disease, hypertension, and hyperlipidemia who presents   for follow-up. The patient was first evaluated in our chest pain unit in the office on   2015. At that time, he had been experiencing exertional chest pressure   accompanied by significant dyspnea on exertion for approximately 1 month. His   symptoms were very concerning for unstable angina based on his description and the   nature of the progression of the symptoms. He subsequently underwent a diagnostic   left hear catheterization on 2015 by Dr. Lau. At that time, he was found to have   very significant coronary artery disease. His LAD was large and had a 70% lesion   followed by an 80% lesion in the mid segment. The right coronary artery was also   large and dominant and had diffuse 70% mid disease, as well as a 90% focal distal   stenosis followed by a 95% distal focal stenosis as well. He subsequently underwent   placement of a 3.25 x 38 mm Xience Alpine drug eluting stent overlapping with a 3.0 x   8 mm Xience Alpine drug eluting stent to cover both distal right coronary artery lesions.   He also had a placement of a 4.0 x 38 mm Xience drug eluting stent to the mid-right   coronary artery which overlapped with the distal stents. He also had placement of a   3.25 x 33 mm Xience Alpine drug eluting stent to the mid LAD at that time as well.     The patient was admitted on 2018 with several episodes of chest  pressure which   was similar to his previous angina.  A CT angiogram of his chest on 4/6/2018 showed   no evidence for pulmonary emboli.  He did undergo a repeat diagnostic left heart   catheterization on 4/8/2018 which showed widely patent stents in his mid LAD and   throughout his RCA.  Otherwise, the most significant lesion was a distal left circumflex   which had a 50% focal lesion.  Medical therapy was recommended at that time.     The patient presents today for follow-up.  He has no symptoms from a heart perspective.    He denied any chest pain or shortness of breath.  He does state that he has noticed   some wheezing, although he continues to smoke.  He has not been taking his blood   pressure at home.  His only complaint is that he has had some right calf pain, as well   as a lesser degree in the left calf.  This does occur with walking, although he is vague   in describing it.  He has not had any issues with his legs being cold.  He does state   that the pain in his calves can also occur with rest.    Past Medical History:   Diagnosis Date   • Chronic back pain    • Coronary artery disease 04/28/2015    s/p 3 CARMEN to RCA and CARMEN to mid LAD on 4/28/15   • Hyperlipidemia    • Hypertension        Past Surgical History:   Procedure Laterality Date   • APPENDECTOMY     • BACK SURGERY      x2   • CARDIAC CATHETERIZATION     • CARDIAC CATHETERIZATION N/A 4/8/2018    Procedure: Left Heart Cath;  Surgeon: Jose Carlton MD;  Location: [facility] INVASIVE LOCATION;  Service: Cardiovascular   • CARDIAC CATHETERIZATION N/A 4/8/2018    Procedure: Coronary angiography;  Surgeon: Jose Carlton MD;  Location: [facility] INVASIVE LOCATION;  Service: Cardiovascular   • CARDIAC CATHETERIZATION N/A 4/8/2018    Procedure: Left ventriculography;  Surgeon: Jose Carlton MD;  Location: [facility] INVASIVE LOCATION;  Service: Cardiovascular   • CORONARY ANGIOPLASTY  04/28/2015    A) Xience Alpine drug eluting stent [id number] overlapping  with 3.0x8mm Xience alpine drug eluting stent to cover distal RCA . B) 4.0 x 38mm Xience Alpine drug eluting stent cover the mid RCA which overlaps with the distal stents. C) 3.25 x 33mm Xience Alpine drug eluting stent to mid LAD.    • SHOULDER SURGERY      right    • VASECTOMY         Current Outpatient Medications on File Prior to Visit   Medication Sig Dispense Refill   • amLODIPine (NORVASC) 5 MG tablet Take 5 mg by mouth Daily.     • Ascorbic Acid (VITAMIN C) 500 MG capsule Take 500 mg by mouth Daily.     • aspirin 81 MG EC tablet Take 81 mg by mouth Daily.     • atorvastatin (LIPITOR) 20 MG tablet Take 20 mg by mouth Daily.     • budesonide-formoterol (SYMBICORT) 160-4.5 MCG/ACT inhaler Symbicort 160 mcg-4.5 mcg/actuation HFA aerosol inhaler     • metoprolol succinate XL (TOPROL-XL) 50 MG 24 hr tablet Take 50 mg by mouth Daily.       No current facility-administered medications on file prior to visit.      Allergies as of 09/17/2019 - Reviewed 01/27/2019   Allergen Reaction Noted   • No known drug allergy  07/02/2016   • Relafen [nabumetone] Rash 04/06/2018     Social History     Socioeconomic History   • Marital status:      Spouse name: Not on file   • Number of children: Not on file   • Years of education: Not on file   • Highest education level: Not on file   Tobacco Use   • Smoking status: Current Every Day Smoker     Packs/day: 3.00     Years: 50.00     Pack years: 150.00     Types: Cigarettes   • Smokeless tobacco: Never Used   • Tobacco comment: Smoked 2-3 ppd for 50 years.  Quit at time of stents in 4/15.   Substance and Sexual Activity   • Alcohol use: No   • Drug use: No     Family History   Problem Relation Age of Onset   • Coronary artery disease Neg Hx        Review of Systems   Cardiovascular: Positive for claudication.   Respiratory: Positive for wheezing.    Neurological: Positive for numbness and paresthesias.   All other systems reviewed and are negative.     Objective:     Vitals:  "   09/17/19 1114   BP: 128/82   Pulse: 82   SpO2: 95%   Weight: 79.8 kg (176 lb)   Height: 160 cm (63\")     Body mass index is 31.18 kg/m².    Physical Exam   Constitutional: He is oriented to person, place, and time. He appears well-developed and well-nourished.   HENT:   Head: Normocephalic and atraumatic.   Eyes: Conjunctivae are normal.   Neck: Neck supple.   Cardiovascular: Normal rate and regular rhythm. Exam reveals no gallop and no friction rub.   No murmur heard.  Pulmonary/Chest: Effort normal. He has decreased breath sounds.   Abdominal: Soft. There is no tenderness.   Musculoskeletal: He exhibits no edema.   Neurological: He is alert and oriented to person, place, and time.   Skin: Skin is warm.   Psychiatric: He has a normal mood and affect. His behavior is normal.     Lab Review:   Procedures    Cardiac Procedures:  1. Left heart catheterization on 4/28/2015: The left main coronary artery was normal. The left   circumflex had 30% diffuse mid disease and 20% to 30% distal disease. The LAD was large   and had a 70% to 80% focal mid-stenosis. The right coronary artery was dominant and had   diffuse 70% mid disease followed by a 90% focal distal stenosis which was then followed by   another 95% more distal stenosis.   a. Status post placement of a 3.25 x 38 mm Xience Alpine drug eluting stent overlapping with   a 3.0 x 8 mm Xience Alpine drug eluting stent to cover the distal right coronary artery lesion.   b. Status post placement of a 4.0 x 38 mm Xience Alpine drug eluting stent to the mid right   coronary artery which overlaps with the distal stents.   c. Status post placement of a 3.25 x 33 mm Xience Alpine drug eluting stent to the mid LAD.   2.  CT angiogram of the chest on 4/6/2018: No pulmonary embolism or acute pathology.  3.  Left heart catheterization on 4/8/2018 by Dr. Carlton: The left main was normal.  LAD had 20%   diffuse proximal disease.  The mid LAD stent was widely patent.  OM1 had a 40% " proximal   lesion.  The distal left circumflex had 50% disease.  Stents throughout the RCA were patent   with no in-stent restenosis.  The PDA had 30% diffuse disease.       Assessment:       Diagnosis Plan   1. Claudication (CMS/HCC)  Doppler Arterial Multi Level Lower Extremity - Bilateral CAR   2. Coronary artery disease involving native coronary artery of native heart without angina pectoris     3. Essential hypertension       Plan:       The patient continues to smoke, and he is not obvious risk for peripheral vascular disease.  I have discussed smoking cessation with him on multiple occasions, and did so again today.  However, he is not ready to quit.  I am not sure if the pain in his calves is from PVD or another etiology at this point.  However, I would recommend performing a walking REJI test to further assess this.  He will continue on the aspirin and Lipitor given his coronary artery disease.  He has not had any anginal type symptoms.  He is currently taking Toprol-XL 75 mg/day and amlodipine 5 mg/day.  I have asked him to watch his blood pressure more closely at home and let me know if it is consistently running 130/80 or above.  I did refill his sublingual nitroglycerin as well.  I will have him return to the office in 8 months unless other issues arise.

## 2020-03-05 ENCOUNTER — APPOINTMENT (OUTPATIENT)
Dept: NUCLEAR MEDICINE | Facility: HOSPITAL | Age: 70
End: 2020-03-05

## 2020-03-05 ENCOUNTER — HOSPITAL ENCOUNTER (OUTPATIENT)
Facility: HOSPITAL | Age: 70
Discharge: HOME | End: 2020-03-06
Attending: INTERNAL MEDICINE | Admitting: INTERNAL MEDICINE

## 2020-03-05 DIAGNOSIS — I25.110 CORONARY ARTERY DISEASE INVOLVING NATIVE CORONARY ARTERY OF NATIVE HEART WITH UNSTABLE ANGINA PECTORIS (HCC): Primary | ICD-10-CM

## 2020-03-05 LAB
ACT BLD: 274 SECONDS (ref 82–152)
ANION GAP SERPL CALCULATED.3IONS-SCNC: 12.5 MMOL/L (ref 5–15)
BH CV STRESS COMMENTS STAGE 1: NORMAL
BH CV STRESS DOSE REGADENOSON STAGE 1: 0.4
BH CV STRESS DURATION MIN STAGE 1: 0
BH CV STRESS DURATION SEC STAGE 1: 10
BH CV STRESS PROTOCOL 1: NORMAL
BH CV STRESS RECOVERY BP: NORMAL MMHG
BH CV STRESS RECOVERY HR: 78 BPM
BH CV STRESS STAGE 1: 1
BUN BLD-MCNC: 16 MG/DL (ref 8–23)
BUN/CREAT SERPL: 20 (ref 7–25)
CALCIUM SPEC-SCNC: 8.5 MG/DL (ref 8.6–10.5)
CHLORIDE SERPL-SCNC: 105 MMOL/L (ref 98–107)
CO2 SERPL-SCNC: 23.5 MMOL/L (ref 22–29)
CREAT BLD-MCNC: 0.8 MG/DL (ref 0.76–1.27)
DEPRECATED RDW RBC AUTO: 46.5 FL (ref 37–54)
ERYTHROCYTE [DISTWIDTH] IN BLOOD BY AUTOMATED COUNT: 14.6 % (ref 12.3–15.4)
GFR SERPL CREATININE-BSD FRML MDRD: 95 ML/MIN/1.73
GLUCOSE BLD-MCNC: 117 MG/DL (ref 65–99)
HCT VFR BLD AUTO: 40.1 % (ref 37.5–51)
HGB BLD-MCNC: 13.6 G/DL (ref 13–17.7)
LV EF NUC BP: 71 %
MAXIMAL PREDICTED HEART RATE: 149 BPM
MCH RBC QN AUTO: 29.5 PG (ref 26.6–33)
MCHC RBC AUTO-ENTMCNC: 33.9 G/DL (ref 31.5–35.7)
MCV RBC AUTO: 87 FL (ref 79–97)
PERCENT MAX PREDICTED HR: 61.07 %
PLATELET # BLD AUTO: 241 10*3/MM3 (ref 140–450)
PMV BLD AUTO: 10.4 FL (ref 6–12)
POTASSIUM BLD-SCNC: 3.7 MMOL/L (ref 3.5–5.2)
RBC # BLD AUTO: 4.61 10*6/MM3 (ref 4.14–5.8)
SODIUM BLD-SCNC: 141 MMOL/L (ref 136–145)
STRESS BASELINE BP: NORMAL MMHG
STRESS BASELINE HR: 65 BPM
STRESS PERCENT HR: 72 %
STRESS POST PEAK BP: NORMAL MMHG
STRESS POST PEAK HR: 91 BPM
STRESS TARGET HR: 127 BPM
TROPONIN T SERPL-MCNC: <0.01 NG/ML (ref 0–0.03)
WBC NRBC COR # BLD: 8.82 10*3/MM3 (ref 3.4–10.8)

## 2020-03-05 PROCEDURE — G0378 HOSPITAL OBSERVATION PER HR: HCPCS

## 2020-03-05 PROCEDURE — 93018 CV STRESS TEST I&R ONLY: CPT | Performed by: INTERNAL MEDICINE

## 2020-03-05 PROCEDURE — C1769 GUIDE WIRE: HCPCS | Performed by: INTERNAL MEDICINE

## 2020-03-05 PROCEDURE — 63710000001 METOPROLOL SUCCINATE XL 50 MG TABLET SUSTAINED-RELEASE 24 HOUR: Performed by: INTERNAL MEDICINE

## 2020-03-05 PROCEDURE — 0 TECHNETIUM SESTAMIBI: Performed by: INTERNAL MEDICINE

## 2020-03-05 PROCEDURE — 63710000001 ASPIRIN 325 MG TABLET: Performed by: INTERNAL MEDICINE

## 2020-03-05 PROCEDURE — 93010 ELECTROCARDIOGRAM REPORT: CPT | Performed by: INTERNAL MEDICINE

## 2020-03-05 PROCEDURE — 78452 HT MUSCLE IMAGE SPECT MULT: CPT | Performed by: INTERNAL MEDICINE

## 2020-03-05 PROCEDURE — 63710000001 ATORVASTATIN 20 MG TABLET: Performed by: INTERNAL MEDICINE

## 2020-03-05 PROCEDURE — 63710000001 NITROGLYCERIN 2 % OINTMENT: Performed by: INTERNAL MEDICINE

## 2020-03-05 PROCEDURE — 63710000001 CLOPIDOGREL 75 MG TABLET: Performed by: INTERNAL MEDICINE

## 2020-03-05 PROCEDURE — C1894 INTRO/SHEATH, NON-LASER: HCPCS | Performed by: INTERNAL MEDICINE

## 2020-03-05 PROCEDURE — 93016 CV STRESS TEST SUPVJ ONLY: CPT | Performed by: INTERNAL MEDICINE

## 2020-03-05 PROCEDURE — 99219 PR INITIAL OBSERVATION CARE/DAY 50 MINUTES: CPT | Performed by: INTERNAL MEDICINE

## 2020-03-05 PROCEDURE — 93458 L HRT ARTERY/VENTRICLE ANGIO: CPT | Performed by: INTERNAL MEDICINE

## 2020-03-05 PROCEDURE — 25010000002 HEPARIN (PORCINE) PER 1000 UNITS: Performed by: INTERNAL MEDICINE

## 2020-03-05 PROCEDURE — 96361 HYDRATE IV INFUSION ADD-ON: CPT

## 2020-03-05 PROCEDURE — A9270 NON-COVERED ITEM OR SERVICE: HCPCS | Performed by: INTERNAL MEDICINE

## 2020-03-05 PROCEDURE — C9600 PERC DRUG-EL COR STENT SING: HCPCS | Performed by: INTERNAL MEDICINE

## 2020-03-05 PROCEDURE — C1725 CATH, TRANSLUMIN NON-LASER: HCPCS | Performed by: INTERNAL MEDICINE

## 2020-03-05 PROCEDURE — 85027 COMPLETE CBC AUTOMATED: CPT | Performed by: INTERNAL MEDICINE

## 2020-03-05 PROCEDURE — 25010000002 MIDAZOLAM PER 1 MG: Performed by: INTERNAL MEDICINE

## 2020-03-05 PROCEDURE — C1874 STENT, COATED/COV W/DEL SYS: HCPCS | Performed by: INTERNAL MEDICINE

## 2020-03-05 PROCEDURE — 25010000002 REGADENOSON 0.4 MG/5ML SOLUTION: Performed by: INTERNAL MEDICINE

## 2020-03-05 PROCEDURE — 93005 ELECTROCARDIOGRAM TRACING: CPT | Performed by: INTERNAL MEDICINE

## 2020-03-05 PROCEDURE — 78452 HT MUSCLE IMAGE SPECT MULT: CPT

## 2020-03-05 PROCEDURE — 80048 BASIC METABOLIC PNL TOTAL CA: CPT | Performed by: INTERNAL MEDICINE

## 2020-03-05 PROCEDURE — 63710000001 AMLODIPINE 5 MG TABLET: Performed by: INTERNAL MEDICINE

## 2020-03-05 PROCEDURE — 25010000002 FENTANYL CITRATE (PF) 100 MCG/2ML SOLUTION: Performed by: INTERNAL MEDICINE

## 2020-03-05 PROCEDURE — G0379 DIRECT REFER HOSPITAL OBSERV: HCPCS

## 2020-03-05 PROCEDURE — 92928 PRQ TCAT PLMT NTRAC ST 1 LES: CPT | Performed by: INTERNAL MEDICINE

## 2020-03-05 PROCEDURE — 96360 HYDRATION IV INFUSION INIT: CPT

## 2020-03-05 PROCEDURE — A9500 TC99M SESTAMIBI: HCPCS | Performed by: INTERNAL MEDICINE

## 2020-03-05 PROCEDURE — 84484 ASSAY OF TROPONIN QUANT: CPT | Performed by: INTERNAL MEDICINE

## 2020-03-05 PROCEDURE — 85347 COAGULATION TIME ACTIVATED: CPT

## 2020-03-05 PROCEDURE — C1887 CATHETER, GUIDING: HCPCS | Performed by: INTERNAL MEDICINE

## 2020-03-05 PROCEDURE — 0 IOPAMIDOL PER 1 ML: Performed by: INTERNAL MEDICINE

## 2020-03-05 PROCEDURE — 93017 CV STRESS TEST TRACING ONLY: CPT

## 2020-03-05 DEVICE — XIENCE SIERRA™ EVEROLIMUS ELUTING CORONARY STENT SYSTEM 2.50 MM X 15 MM / RAPID-EXCHANGE
Type: IMPLANTABLE DEVICE | Status: FUNCTIONAL
Brand: XIENCE SIERRA™

## 2020-03-05 RX ORDER — NITROGLYCERIN 0.4 MG/1
0.4 TABLET SUBLINGUAL
Status: DISCONTINUED | OUTPATIENT
Start: 2020-03-05 | End: 2020-03-06 | Stop reason: HOSPADM

## 2020-03-05 RX ORDER — METOPROLOL SUCCINATE 50 MG/1
50 TABLET, EXTENDED RELEASE ORAL
Status: DISCONTINUED | OUTPATIENT
Start: 2020-03-05 | End: 2020-03-06 | Stop reason: HOSPADM

## 2020-03-05 RX ORDER — ASPIRIN 325 MG
TABLET ORAL AS NEEDED
Status: DISCONTINUED | OUTPATIENT
Start: 2020-03-05 | End: 2020-03-05 | Stop reason: HOSPADM

## 2020-03-05 RX ORDER — AMLODIPINE BESYLATE 10 MG/1
10 TABLET ORAL DAILY
Status: DISCONTINUED | OUTPATIENT
Start: 2020-03-05 | End: 2020-03-06 | Stop reason: HOSPADM

## 2020-03-05 RX ORDER — ATORVASTATIN CALCIUM 20 MG/1
20 TABLET, FILM COATED ORAL NIGHTLY
Status: DISCONTINUED | OUTPATIENT
Start: 2020-03-05 | End: 2020-03-06 | Stop reason: HOSPADM

## 2020-03-05 RX ORDER — MORPHINE SULFATE 2 MG/ML
2 INJECTION, SOLUTION INTRAMUSCULAR; INTRAVENOUS
Status: DISCONTINUED | OUTPATIENT
Start: 2020-03-05 | End: 2020-03-06 | Stop reason: HOSPADM

## 2020-03-05 RX ORDER — ASCORBIC ACID 500 MG
500 TABLET ORAL DAILY
Status: DISCONTINUED | OUTPATIENT
Start: 2020-03-05 | End: 2020-03-06 | Stop reason: HOSPADM

## 2020-03-05 RX ORDER — ACETAMINOPHEN 325 MG/1
650 TABLET ORAL EVERY 4 HOURS PRN
Status: DISCONTINUED | OUTPATIENT
Start: 2020-03-05 | End: 2020-03-06 | Stop reason: HOSPADM

## 2020-03-05 RX ORDER — CLOPIDOGREL BISULFATE 75 MG/1
75 TABLET ORAL DAILY
Status: DISCONTINUED | OUTPATIENT
Start: 2020-03-06 | End: 2020-03-06 | Stop reason: HOSPADM

## 2020-03-05 RX ORDER — ASPIRIN 81 MG/1
81 TABLET ORAL DAILY
Status: DISCONTINUED | OUTPATIENT
Start: 2020-03-05 | End: 2020-03-06 | Stop reason: HOSPADM

## 2020-03-05 RX ORDER — MIDAZOLAM HYDROCHLORIDE 1 MG/ML
INJECTION INTRAMUSCULAR; INTRAVENOUS AS NEEDED
Status: DISCONTINUED | OUTPATIENT
Start: 2020-03-05 | End: 2020-03-05 | Stop reason: HOSPADM

## 2020-03-05 RX ORDER — CLOPIDOGREL BISULFATE 75 MG/1
600 TABLET ORAL ONCE
Status: DISCONTINUED | OUTPATIENT
Start: 2020-03-05 | End: 2020-03-06 | Stop reason: HOSPADM

## 2020-03-05 RX ORDER — METOPROLOL SUCCINATE 50 MG/1
50 TABLET, EXTENDED RELEASE ORAL DAILY
Status: DISCONTINUED | OUTPATIENT
Start: 2020-03-05 | End: 2020-03-05

## 2020-03-05 RX ORDER — SODIUM CHLORIDE 9 MG/ML
100 INJECTION, SOLUTION INTRAVENOUS CONTINUOUS
Status: DISCONTINUED | OUTPATIENT
Start: 2020-03-05 | End: 2020-03-06 | Stop reason: HOSPADM

## 2020-03-05 RX ORDER — FENTANYL CITRATE 50 UG/ML
INJECTION, SOLUTION INTRAMUSCULAR; INTRAVENOUS AS NEEDED
Status: DISCONTINUED | OUTPATIENT
Start: 2020-03-05 | End: 2020-03-05 | Stop reason: HOSPADM

## 2020-03-05 RX ORDER — CLOPIDOGREL BISULFATE 75 MG/1
TABLET ORAL AS NEEDED
Status: DISCONTINUED | OUTPATIENT
Start: 2020-03-05 | End: 2020-03-05 | Stop reason: HOSPADM

## 2020-03-05 RX ORDER — ATORVASTATIN CALCIUM 20 MG/1
20 TABLET, FILM COATED ORAL DAILY
Status: DISCONTINUED | OUTPATIENT
Start: 2020-03-05 | End: 2020-03-05

## 2020-03-05 RX ORDER — LIDOCAINE HYDROCHLORIDE 20 MG/ML
INJECTION, SOLUTION INFILTRATION; PERINEURAL AS NEEDED
Status: DISCONTINUED | OUTPATIENT
Start: 2020-03-05 | End: 2020-03-05 | Stop reason: HOSPADM

## 2020-03-05 RX ORDER — HEPARIN SODIUM 1000 [USP'U]/ML
INJECTION, SOLUTION INTRAVENOUS; SUBCUTANEOUS AS NEEDED
Status: DISCONTINUED | OUTPATIENT
Start: 2020-03-05 | End: 2020-03-05 | Stop reason: HOSPADM

## 2020-03-05 RX ORDER — SODIUM CHLORIDE 9 MG/ML
INJECTION, SOLUTION INTRAVENOUS CONTINUOUS PRN
Status: COMPLETED | OUTPATIENT
Start: 2020-03-05 | End: 2020-03-05

## 2020-03-05 RX ORDER — ASPIRIN 325 MG
325 TABLET, DELAYED RELEASE (ENTERIC COATED) ORAL DAILY
Status: DISCONTINUED | OUTPATIENT
Start: 2020-03-05 | End: 2020-03-06 | Stop reason: HOSPADM

## 2020-03-05 RX ORDER — AMLODIPINE BESYLATE 5 MG/1
5 TABLET ORAL
Status: DISCONTINUED | OUTPATIENT
Start: 2020-03-05 | End: 2020-03-05

## 2020-03-05 RX ORDER — BUDESONIDE AND FORMOTEROL FUMARATE DIHYDRATE 160; 4.5 UG/1; UG/1
2 AEROSOL RESPIRATORY (INHALATION) DAILY PRN
Status: DISCONTINUED | OUTPATIENT
Start: 2020-03-05 | End: 2020-03-06 | Stop reason: HOSPADM

## 2020-03-05 RX ADMIN — METOPROLOL SUCCINATE 50 MG: 50 TABLET, EXTENDED RELEASE ORAL at 09:36

## 2020-03-05 RX ADMIN — SODIUM CHLORIDE 100 ML/HR: 9 INJECTION, SOLUTION INTRAVENOUS at 14:13

## 2020-03-05 RX ADMIN — TECHNETIUM TC 99M SESTAMIBI 1 DOSE: 1 INJECTION INTRAVENOUS at 10:20

## 2020-03-05 RX ADMIN — NITROGLYCERIN 0.5 INCH: 20 OINTMENT TOPICAL at 06:56

## 2020-03-05 RX ADMIN — TECHNETIUM TC 99M SESTAMIBI 1 DOSE: 1 INJECTION INTRAVENOUS at 13:00

## 2020-03-05 RX ADMIN — NITROGLYCERIN 0.5 INCH: 20 OINTMENT TOPICAL at 14:13

## 2020-03-05 RX ADMIN — ATORVASTATIN CALCIUM 20 MG: 20 TABLET, FILM COATED ORAL at 09:36

## 2020-03-05 RX ADMIN — AMLODIPINE BESYLATE 5 MG: 5 TABLET ORAL at 09:36

## 2020-03-05 RX ADMIN — REGADENOSON 0.4 MG: 0.08 INJECTION, SOLUTION INTRAVENOUS at 13:00

## 2020-03-05 NOTE — PROGRESS NOTES
Discharge Planning Assessment  [facility]     Patient Name: David Austin Jr.  MRN: [id number]  Today's Date: 3/5/2020    Admit Date: 3/5/2020    Discharge Needs Assessment     Row Name 03/05/20 1136       Living Environment    Lives With  other (see comments) Lives with S/O adult son    Current Living Arrangements  home/apartment/condo    Primary Care Provided by  self    Provides Primary Care For  no one, unable/limited ability to care for self    Family Caregiver if Needed  significant other;sibling(s)    Quality of Family Relationships  helpful;involved;supportive    Able to Return to Prior Arrangements  yes       Resource/Environmental Concerns    Resource/Environmental Concerns  none    Transportation Concerns  car, none       Transition Planning    Patient/Family Anticipates Transition to  home with family    Patient/Family Anticipated Services at Transition  none       Discharge Needs Assessment    Readmission Within the Last 30 Days  no previous admission in last 30 days    Concerns to be Addressed  no discharge needs identified;denies needs/concerns at this time    Equipment Currently Used at Home  cane, straight    Anticipated Changes Related to Illness  none    Equipment Needed After Discharge  none    Provided Post Acute Provider List?  N/A        Discharge Plan     Row Name 03/05/20 113       Plan    Plan  Home with S/O    Patient/Family in Agreement with Plan  yes    Plan Comments  Met [facility] patient at the bedside. Explained CCP role and verified facesheet. Patient lives at home with his significant other's adult son. His S/O lives in a trailer in his front yard. Patient is ADLs but has a cane if needed. Patient denies having any needs at DC. CCP to follow. Khloe Madden RN               Demographic Summary     Row Name 03/05/20 1128       General Information    Admission Type  observation    Arrived From  hospital    Referral Source  admission list    Reason for Consult  discharge planning     Preferred Language  English       Contact Information    Permission Granted to Share Info With  family/designee        Functional Status     Row Name 03/05/20 1135       Functional Status    Usual Activity Tolerance  good    Current Activity Tolerance  good       Functional Status, IADL    Medications  independent    Meal Preparation  independent    Housekeeping  independent    Laundry  independent    Shopping  independent       Mental Status    General Appearance WDL  WDL       Mental Status Summary    Recent Changes in Mental Status/Cognitive Functioning  no changes        Psychosocial     Row Name 03/05/20 1135       Behavior WDL    Behavior WDL  WDL       Emotion Mood WDL    Emotion/Mood/Affect WDL  WDL       Speech WDL    Speech WDL  WDL       Perceptual State WDL    Perceptual State WDL  WDL       Thought Process WDL    Thought Process WDL  WDL       Intellectual Performance WDL    Intellectual Performance WDL  WDL       Coping/Stress    Major Change/Loss/Stressor  none    Patient Personal Strengths  able to adapt    Sources of Support  sibling(s);significant other    Reaction to Health Status  accepting    Understanding of Condition and Treatment  partial understanding of medical condition;partial understanding of treatment       Developmental Stage (Eriksson's)    Developmental Stage  Stage 8 (65 years-death/Late Adulthood) Integrity vs. Despair                Khloe Madden RN

## 2020-03-05 NOTE — H&P
"Date of admission: 20    Encounter Provider: Barak Magallon MD    Group of Service: [facility]     Patient Name: David Austin Jr.    :1950    Chief complaint:  Chest pain    History of Present Illness:  Ms austin is a 69 year old male patient of mine with history of coronary artery disease, hypertension and hyperlipidemia. He was first evaluated in our chests pain unit in 2015 after reports if exertional chest pressure and associated dyspnea. He subsequently underwent cardiac catheterization 4/28/15 with Dr. Lau noting significant coronary disease with a 70% lesion followed by a 80% lesion in his mid LAD, as well as a 90% focal distal stenosis followed by a 95% distal focal stenosis as well. He underwent placement of a 3.25 X 38 mm Xience Alpine CARMEN overlapping with a 3.0 X 8 mm Xience Alpine CARMEN to cover both distal RCA lesions, as well as a 4.0 X 38 mm Xience CARMEN to the mid-right coronary artery which overlapped with the distal stents and a 3.25 X 33 mm Xience Alpine CARMEN to the mid LAD.     He was late admitted in 2018 with several episodes of chest pressure that were rep[ortidly similar to that he had experience with his previous stenting. A CTA chest at the time was negative for any evidence of pulmonary embolism and he underwent a repeat cardiac catheterization 18 noting widely patent stents in his mid LAD and throughout his RCA. He was also noted to have a 50% focal lesion to his distal left circumflex that was treated medically at that time.     The patient presented after several hours of chest pain which he states was mainly right-sided.  He described it as a \"hard pain\", but could not give me further details.  He says that it was hurting significantly for several hours.  He went to the [facility] emergency department, and was subsequently transferred to [facility].  His EKG has not shown any changes thus far.  His troponins remain negative.  He still " has approximately 1 out of 10 chest discomfort.      Previous Cardiac Testing:    Cardiac Catheterization 4/8/18  FINDINGS:  1. HEMODYNAMICS:  /10, /59/82.  2. LEFT VENTRICULOGRAPHY: EF 70%, no mitral regurgitation.  3. CORONARY ANGIOGRAPHY: Right dominant system, moderate one-vessel coronary disease.  The left main is large and normal.  The proximal LAD has 20% diffuse stenosis.  The mid LAD has a stent with 0% stenosis.  The distal LAD has 10% stenosis.  The circumflex is moderate sized.  The proximal vessel has 10% stenosis.  OM1 has 40% proximal stenosis.  The distal circumflex has 50% focal stenosis.  The RCA is stented from proximal to distal vessel with 0% restenosis.  The PDA has 30% diffuse stenosis.  SUMMARY: Widely patent LAD and RCA stents.  RECOMMENDATIONS: Medical management.      Past Medical History:   Diagnosis Date   • Chronic back pain    • Coronary artery disease 04/28/2015    s/p 3 CARMEN to RCA and CARMEN to mid LAD on 4/28/15   • Hyperlipidemia    • Hypertension          Past Surgical History:   Procedure Laterality Date   • APPENDECTOMY     • BACK SURGERY      x2   • CARDIAC CATHETERIZATION     • CARDIAC CATHETERIZATION N/A 4/8/2018    Procedure: Left Heart Cath;  Surgeon: Jose Carlton MD;  Location: [facility] CATH INVASIVE LOCATION;  Service: Cardiovascular   • CARDIAC CATHETERIZATION N/A 4/8/2018    Procedure: Coronary angiography;  Surgeon: Jose Carlton MD;  Location: [facility] CATH INVASIVE LOCATION;  Service: Cardiovascular   • CARDIAC CATHETERIZATION N/A 4/8/2018    Procedure: Left ventriculography;  Surgeon: Jose Carlton MD;  Location: [facility] CATH INVASIVE LOCATION;  Service: Cardiovascular   • CORONARY ANGIOPLASTY  04/28/2015    A) Xience Alpine drug eluting stent [id number] overlapping with 3.0x8mm Xience alpine drug eluting stent to cover distal RCA . B) 4.0 x 38mm Xience Alpine drug eluting stent cover the mid RCA which overlaps with the distal stents. C) 3.25 x 33mm Xience Alpine drug  eluting stent to mid LAD.    • SHOULDER SURGERY      right    • VASECTOMY           Allergies   Allergen Reactions   • No Known Drug Allergy    • Relafen [Nabumetone] Rash         No current facility-administered medications on file prior to encounter.      Current Outpatient Medications on File Prior to Encounter   Medication Sig Dispense Refill   • amLODIPine (NORVASC) 10 MG tablet Take 10 mg by mouth Daily.     • Ascorbic Acid (VITAMIN C) 500 MG capsule Take 500 mg by mouth Daily.     • aspirin 81 MG EC tablet Take 81 mg by mouth Daily.     • atorvastatin (LIPITOR) 20 MG tablet Take 20 mg by mouth Daily.     • budesonide-formoterol (SYMBICORT) 160-4.5 MCG/ACT inhaler Inhale 2 puffs Daily As Needed.     • metoprolol succinate XL (TOPROL-XL) 50 MG 24 hr tablet Take 50 mg by mouth Daily.     • nitroglycerin (NITROSTAT) 0.4 MG SL tablet Place 1 tablet under the tongue Every 5 (Five) Minutes As Needed for Chest Pain. Take no more than 3 doses in 15 minutes. 25 tablet 6         Social History     Socioeconomic History   • Marital status:      Spouse name: Not on file   • Number of children: Not on file   • Years of education: Not on file   • Highest education level: Not on file   Tobacco Use   • Smoking status: Current Every Day Smoker     Packs/day: 3.00     Years: 50.00     Pack years: 150.00     Types: Cigarettes   • Smokeless tobacco: Never Used   • Tobacco comment: Smoked 2-3 ppd for 50 years.  Quit at time of stents in 4/15.   Substance and Sexual Activity   • Alcohol use: No   • Drug use: No         Family History   Problem Relation Age of Onset   • Coronary artery disease Neg Hx        REVIEW OF SYSTEMS:   Pertinent positives were noted in the HPI above.  Otherwise, all other systems were reviewed, and are negative.     Objective:     Vitals:    03/05/20 0752 03/05/20 0936 03/05/20 1413 03/05/20 1449   BP: 129/70 129/70 130/76 119/68   BP Location: Left arm   Left arm   Patient Position: Lying   Lying    Pulse: 70 69 72 68   Resp: 18   18   Temp: 98 °F (36.7 °C)   97.4 °F (36.3 °C)   TempSrc: Oral   Oral   SpO2: 93%        There is no height or weight on file to calculate BMI.       General:    No acute distress, alert and oriented x4, pleasant                   Head:    Normocephalic, atraumatic.   Eyes:          Conjunctivae and sclerae normal, no icterus, PERRLA   Throat:   No oral lesions, no thrush, oral mucosa moist.    Neck:   Supple, trachea midline.   Lungs:     Decreased breath sounds bilaterally    Heart:    Regular rhythm and normal rate.  No murmurs, gallops, or rubs noted.   Abdomen:     Soft, non-tender, non-distended, positive bowel sounds.    Extremities:   No clubbing, cyanosis, or edema.     Pulses:   Pulses palpable and equal bilaterally.    Skin:   No bleeding or rash.   Neuro:   Non-focal.  Moves all extremities well.    Psychiatric:   Normal mood and affect.       Lab Review:                Results from last 7 days   Lab Units 03/05/20  0548   SODIUM mmol/L 141   POTASSIUM mmol/L 3.7   CHLORIDE mmol/L 105   CO2 mmol/L 23.5   BUN mg/dL 16   CREATININE mg/dL 0.80   GLUCOSE mg/dL 117*   CALCIUM mg/dL 8.5*     Results from last 7 days   Lab Units 03/05/20  0548   TROPONIN T ng/mL <0.010     Results from last 7 days   Lab Units 03/05/20  0548   WBC 10*3/mm3 8.82   HEMOGLOBIN g/dL 13.6   HEMATOCRIT % 40.1   PLATELETS 10*3/mm3 241                       EKG (reviewed by me personally):    EKG 3/5/20    Previous EKG 4/6/18            Assessment:   1.  Coronary artery disease  2.  Ongoing tobacco use  3.  Hypertension    Plan:       At this point, I have recommended proceeding with a nuclear stress test.  His troponin is negative, and his EKG is unchanged.  He does still smoke, and is obviously at risk for progression of disease.  If his nuclear stress test is abnormal, he will need a repeat heart catheterization.  In the meantime, he will continue on the aspirin, Lipitor, and metoprolol.  I have  discussed smoking cessation with him on multiple occasions, and he does not want to quit.    Jesse Magallon MD

## 2020-03-05 NOTE — PROGRESS NOTES
Clinical Pharmacy Services: Medication History    David Austin Jr. is a 69 y.o. male presenting to [facility] for Chest pain [R07.9]    He  has a past medical history of Chronic back pain, Coronary artery disease (04/28/2015), Hyperlipidemia, and Hypertension.    Allergies as of 03/04/2020 - Reviewed 10/12/2019   Allergen Reaction Noted   • No known drug allergy  07/02/2016   • Relafen [nabumetone] Rash 04/06/2018     Medication information was obtained from: Patient (had medication bottles with him)  Pharmacy and Phone Number: [facility] on [address] [phone]    Prior to Admission Medications     Prescriptions Last Dose Informant Patient Reported? Taking?    amLODIPine (NORVASC) 10 MG tablet  Self Yes Yes    Take 10 mg by mouth Daily.    Ascorbic Acid (VITAMIN C) 500 MG capsule  Self Yes Yes    Take 500 mg by mouth Daily.    aspirin 81 MG EC tablet  Self Yes Yes    Take 81 mg by mouth Daily.    atorvastatin (LIPITOR) 20 MG tablet  Self Yes Yes    Take 20 mg by mouth Daily.    budesonide-formoterol (SYMBICORT) 160-4.5 MCG/ACT inhaler  Self Yes Yes    Inhale 2 puffs Daily As Needed.    metoprolol succinate XL (TOPROL-XL) 50 MG 24 hr tablet  Self Yes Yes    Take 50 mg by mouth Daily.    nitroglycerin (NITROSTAT) 0.4 MG SL tablet   No Yes    Place 1 tablet under the tongue Every 5 (Five) Minutes As Needed for Chest Pain. Take no more than 3 doses in 15 minutes.        Medication notes: Medications as listed above are correct - patient showed me his medication bottles. Of note, amlodipine was recently increased from 5 mg daily to 10 mg daily and metoprolol succinate was recently decreased from 50 mg ER 1.5 tabs daily to 50 mg ER 1 tab daily.     This medication list is complete to the best of my knowledge as of 3/5/2020    Please call if questions.    Thanks, Leigh Adair, PharmD Candidate 2020  3/5/2020 3:43 PM

## 2020-03-05 NOTE — NURSING NOTE
Patient admitted from [facility] per EMS, awake and oriented, only complaining of chest soreness right upper chest, monitor shows NSR, /76, on room air 02 Sat 93%.

## 2020-03-05 NOTE — DISCHARGE INSTRUCTIONS
[facility]  [address] [place] [address]    Coronary Angiogram with Stent (Radial Approach) After Care    Refer to this sheet in the next few weeks. These instructions provide you with information on caring for yourself after your procedure. Your health care provider may also give you more specific instructions. Your treatment has been planned according to current medical practices, but problems sometimes occur. Call your health care provider if you have any problems or questions after your procedure.       Home Care Instructions:  · You may shower the day after the procedure. Remove the bandage (dressing) and gently wash the site with plain soap and water. Gently pat the site dry. You may apply a band aid daily for 2 days if desired.    · Do not apply powder or lotion to the site.  · Do not submerge the affected site in water for 3 to 5 days or until the site is completely healed.   · Do not flex or bend the affected arm for 24 hours.  · Do not lift, push or pull anything over 10 pounds for 2 days after your procedure.  · Do not operate machinery or power tools for 24 hours.  · Inspect the site at least twice daily. You may notice some bruising at the site and it may be tender for 1 to 2 weeks.     · Increase your fluid intake for the next 2 days.    · Keep arm elevated for 24 hours.  For the remainder of the day, keep your arm in the “Pledge of Allegiance” position when up and about.    · Limit your activity for the next 48 hours and avoid strenuous activity as directed by your physician.   · Cardiac Rehab may or may not be ordered.  Please consult with your physician  · You may drive 24 hours after the procedure unless otherwise instructed by your caregiver.  · A responsible adult should be with you for the first 24 hours after you arrive home.   · Do not make any important legal decisions or sign legal papers for 24 hours. Do not drink alcohol for 24 hours.    · Take medicines only as  directed by your health care provider.  Blood thinners may be prescribed after your procedure to improve blood flow through the stent.    · Metformin or any medications containing Metformin should not be taken for 48 hours after your procedure.    · Eat a heart-healthy diet. This should include plenty of fresh fruits and vegetables. Meat should be lean cuts. Avoid the following types of food:    ¨ Food that is high in salt.    ¨ Canned or highly processed food.    ¨ Food that is high in saturated fat or sugar.    ¨ Fried food.    · Make any other lifestyle changes recommended by your health care provider. This may include:    ¨ Not using any tobacco products including cigarettes, chewing tobacco, or electronic cigarettes.   ¨ Managing your weight.    ¨ Getting regular exercise.    ¨ Managing your blood pressure.    ¨ Limiting your alcohol intake.    ¨ Managing other health problems, such as diabetes.    · If you need an MRI after your heart stent was placed, be sure to tell the health care provider who orders the MRI that you have a heart stent.    · Keep all follow-up visits as directed by your health care provider.    ·   Call Your Doctor If:  · You have unusual pain at the radial/ulnar (wrist) site.  · You have redness, warmth, swelling, or pain at the radial/ulnar (wrist) site.  · You have drainage (other than a small amount of blood on the dressing).  · `You have chills or a fever > 101.  · Your arm becomes pale or dark, cool, tingly, or numb.  · You develop chest pain, shortness of breath, feel faint or pass out.    · You have heavy bleeding from the site, hold pressure on the site for 20 minutes.  If the bleeding stops, apply a fresh bandage and call your cardiologist.  However, if you continue to have bleeding, call 911.        Make Sure You:   · Understand these instructions.  · Will watch your condition.  · Will get help right away if you are not doing well or get worse.

## 2020-03-06 VITALS
DIASTOLIC BLOOD PRESSURE: 67 MMHG | BODY MASS INDEX: 32.81 KG/M2 | HEART RATE: 59 BPM | WEIGHT: 185.19 LBS | SYSTOLIC BLOOD PRESSURE: 115 MMHG | TEMPERATURE: 97.4 F | RESPIRATION RATE: 18 BRPM | HEIGHT: 63 IN | OXYGEN SATURATION: 95 %

## 2020-03-06 LAB
ANION GAP SERPL CALCULATED.3IONS-SCNC: 14.2 MMOL/L (ref 5–15)
BUN BLD-MCNC: 16 MG/DL (ref 8–23)
BUN/CREAT SERPL: 17.8 (ref 7–25)
CALCIUM SPEC-SCNC: 8.5 MG/DL (ref 8.6–10.5)
CHLORIDE SERPL-SCNC: 105 MMOL/L (ref 98–107)
CO2 SERPL-SCNC: 21.8 MMOL/L (ref 22–29)
CREAT BLD-MCNC: 0.9 MG/DL (ref 0.76–1.27)
DEPRECATED RDW RBC AUTO: 46.2 FL (ref 37–54)
ERYTHROCYTE [DISTWIDTH] IN BLOOD BY AUTOMATED COUNT: 14.5 % (ref 12.3–15.4)
GFR SERPL CREATININE-BSD FRML MDRD: 83 ML/MIN/1.73
GLUCOSE BLD-MCNC: 125 MG/DL (ref 65–99)
HCT VFR BLD AUTO: 41.3 % (ref 37.5–51)
HGB BLD-MCNC: 14.1 G/DL (ref 13–17.7)
MCH RBC QN AUTO: 29.5 PG (ref 26.6–33)
MCHC RBC AUTO-ENTMCNC: 34.1 G/DL (ref 31.5–35.7)
MCV RBC AUTO: 86.4 FL (ref 79–97)
PLATELET # BLD AUTO: 233 10*3/MM3 (ref 140–450)
PMV BLD AUTO: 10.2 FL (ref 6–12)
POTASSIUM BLD-SCNC: 4.2 MMOL/L (ref 3.5–5.2)
RBC # BLD AUTO: 4.78 10*6/MM3 (ref 4.14–5.8)
SODIUM BLD-SCNC: 141 MMOL/L (ref 136–145)
WBC NRBC COR # BLD: 9.3 10*3/MM3 (ref 3.4–10.8)

## 2020-03-06 PROCEDURE — A9270 NON-COVERED ITEM OR SERVICE: HCPCS | Performed by: INTERNAL MEDICINE

## 2020-03-06 PROCEDURE — G0378 HOSPITAL OBSERVATION PER HR: HCPCS

## 2020-03-06 PROCEDURE — 63710000001 METOPROLOL SUCCINATE XL 50 MG TABLET SUSTAINED-RELEASE 24 HOUR: Performed by: INTERNAL MEDICINE

## 2020-03-06 PROCEDURE — 80048 BASIC METABOLIC PNL TOTAL CA: CPT | Performed by: INTERNAL MEDICINE

## 2020-03-06 PROCEDURE — 63710000001 AMLODIPINE 10 MG TABLET: Performed by: INTERNAL MEDICINE

## 2020-03-06 PROCEDURE — 99217 PR OBSERVATION CARE DISCHARGE MANAGEMENT: CPT | Performed by: INTERNAL MEDICINE

## 2020-03-06 PROCEDURE — 93005 ELECTROCARDIOGRAM TRACING: CPT | Performed by: INTERNAL MEDICINE

## 2020-03-06 PROCEDURE — 63710000001 NITROGLYCERIN 2 % OINTMENT: Performed by: INTERNAL MEDICINE

## 2020-03-06 PROCEDURE — 63710000001 CLOPIDOGREL 75 MG TABLET: Performed by: INTERNAL MEDICINE

## 2020-03-06 PROCEDURE — 63710000001 ASPIRIN 81 MG TABLET DELAYED-RELEASE: Performed by: INTERNAL MEDICINE

## 2020-03-06 PROCEDURE — 93010 ELECTROCARDIOGRAM REPORT: CPT | Performed by: INTERNAL MEDICINE

## 2020-03-06 PROCEDURE — 63710000001 VITAMIN C 500 MG TABLET: Performed by: INTERNAL MEDICINE

## 2020-03-06 PROCEDURE — 85027 COMPLETE CBC AUTOMATED: CPT | Performed by: INTERNAL MEDICINE

## 2020-03-06 RX ORDER — CLOPIDOGREL BISULFATE 75 MG/1
75 TABLET ORAL DAILY
Qty: 30 TABLET | Refills: 11 | Status: SHIPPED | OUTPATIENT
Start: 2020-03-07 | End: 2020-12-07

## 2020-03-06 RX ORDER — ATORVASTATIN CALCIUM 20 MG/1
40 TABLET, FILM COATED ORAL DAILY
Qty: 30 TABLET | Refills: 11 | Status: SHIPPED | OUTPATIENT
Start: 2020-03-06 | End: 2020-10-06

## 2020-03-06 RX ORDER — NITROGLYCERIN 0.4 MG/1
0.4 TABLET SUBLINGUAL
Qty: 25 TABLET | Refills: 2 | Status: SHIPPED | OUTPATIENT
Start: 2020-03-06 | End: 2020-05-19 | Stop reason: SDUPTHER

## 2020-03-06 RX ADMIN — OXYCODONE HYDROCHLORIDE AND ACETAMINOPHEN 500 MG: 500 TABLET ORAL at 09:25

## 2020-03-06 RX ADMIN — NITROGLYCERIN 0.5 INCH: 20 OINTMENT TOPICAL at 06:37

## 2020-03-06 RX ADMIN — AMLODIPINE BESYLATE 10 MG: 10 TABLET ORAL at 09:25

## 2020-03-06 RX ADMIN — ASPIRIN 81 MG: 81 TABLET, COATED ORAL at 09:25

## 2020-03-06 RX ADMIN — CLOPIDOGREL 75 MG: 75 TABLET, FILM COATED ORAL at 09:25

## 2020-03-06 RX ADMIN — METOPROLOL SUCCINATE 50 MG: 50 TABLET, EXTENDED RELEASE ORAL at 09:25

## 2020-03-06 NOTE — CONSULTS
Met with pt at [facility] on [facility] s/p stent placement yesterday. Discussed benefits of cardiac rehab and provided [facility] pamphlet along with [facility] handout which lists other cardiac rehab programs in the area.   He lives in [place], [place], which may be closer to [facility], so I provided contact information for that program.  He states he likes to walk in his neighborhood and  cans, but admits that he doesn't do that when the weather is inclement.  I did tell him about [facility] being a possible program he could possibly take advantage of.  I told him we will call him next week to discuss his plans for cardiac rehab and provided my name & phone number if he or his family have any questions later.

## 2020-03-06 NOTE — DISCHARGE SUMMARY
Date of Admission: 3/5/2020    Date of Discharge:  3/6/2020    Discharge Diagnoses:   1. Unstable angina  2. Coronary artery disease  3. Ongoing tobacco use  4. Hypertension    Procedures Performed:  1.  Lexiscan Cardiolite stress test on 3/5/2020  2.  Left heart catheterization on 3/5/2020 by Dr. Cruz  3.  Status post placement of a 2.5 x 15 mm Xience Lea CARMEN to distal LCx on 3/5/2020       Hospital Course:     Please see my history and physical for details on the patient's history.  This is a 69 year-old patient who I am very familiar with from the office.  He has a history of stents to his LAD and RCA in the past.  He has continued to smoke despite multiple counseling sessions on the importance of cessation.    He presented on 3/5/2022 [facility] with approximately 24 hours of intermittent right-sided severe chest pain.  The pain continued to wax and wane up until he was transferred.  It eventually improved with nitroglycerin.  He did rule out for an MI with serial troponin levels.  His EKG remained unchanged.  He underwent a Lexiscan Cardiolite stress test on 3/5/2020.  This showed an inferior defect which was consistent with ischemia.  He subsequently underwent a left heart catheterization on 3/5/2020 by Dr. Cruz.  This showed a 70% distal left circumflex lesion.  He subsequently underwent placement of a 2.5 x 15 mm Xience Lea CARMEN to the distal left circumflex.  He tolerated the procedure well.  He will be placed back on Plavix, and I emphasized the importance of taking dual antiplatelet therapy.  I also increased his Lipitor to 40 mg/day at the time of discharge.  I strongly encouraged smoking cessation, although I do not feel that he is ready to quit.  He will follow-up with COLBY Zamora, in 1 week.  He will follow-up with me as scheduled in May 2020.      Discharge Medications:     Discharge Medications      New Medications      Instructions Start Date   clopidogrel 75  MG tablet  Commonly known as:  PLAVIX   75 mg, Oral, Daily   Start Date:  March 7, 2020        Changes to Medications      Instructions Start Date   atorvastatin 20 MG tablet  Commonly known as:  LIPITOR  What changed:  how much to take   40 mg, Oral, Daily         Continue These Medications      Instructions Start Date   amLODIPine 10 MG tablet  Commonly known as:  NORVASC   10 mg, Oral, Daily      aspirin 81 MG EC tablet   81 mg, Oral, Daily      budesonide-formoterol 160-4.5 MCG/ACT inhaler  Commonly known as:  SYMBICORT   2 puffs, Inhalation, Daily PRN      metoprolol succinate XL 50 MG 24 hr tablet  Commonly known as:  TOPROL-XL   50 mg, Oral, Daily      nitroglycerin 0.4 MG SL tablet  Commonly known as:  NITROSTAT   0.4 mg, Sublingual, Every 5 Minutes PRN, Take no more than 3 doses in 15 minutes.       Vitamin C 500 MG capsule   500 mg, Oral, Daily               Follow-up:  Future Appointments   Date Time Provider Department Center   5/28/2020 10:40 AM Barak Magallon MD [facility] None     Additional Instructions for the Follow-ups that You Need to Schedule     Ambulatory Referral to Cardiac Rehab   As directed          Physical Exam:   General Appearance:    No acute distress, alert and oriented x4   Lungs:     Clear to auscultation bilaterally     Heart:    Regular rhythm and normal rate.  No murmurs, gallops, or       rubs.   Abdomen:     Soft, non-tender, non-distended.    Extremities:   Moves all extremities well.  No clubbing, cyanosis, or edema. Right radial access site without hematoma.       Time Spent on Discharge: Greater than 30 minutes      Barak Magallon MD  03/06/20  11:24 AM

## 2020-03-13 ENCOUNTER — OFFICE VISIT (OUTPATIENT)
Dept: CARDIOLOGY | Facility: CLINIC | Age: 70
End: 2020-03-13

## 2020-03-13 VITALS
OXYGEN SATURATION: 98 % | BODY MASS INDEX: 34.04 KG/M2 | SYSTOLIC BLOOD PRESSURE: 122 MMHG | HEART RATE: 90 BPM | WEIGHT: 185 LBS | DIASTOLIC BLOOD PRESSURE: 68 MMHG | HEIGHT: 62 IN

## 2020-03-13 DIAGNOSIS — I25.110 CORONARY ARTERY DISEASE INVOLVING NATIVE CORONARY ARTERY OF NATIVE HEART WITH UNSTABLE ANGINA PECTORIS (HCC): Primary | ICD-10-CM

## 2020-03-13 DIAGNOSIS — F17.210 CIGARETTE NICOTINE DEPENDENCE WITHOUT COMPLICATION: ICD-10-CM

## 2020-03-13 DIAGNOSIS — I10 ESSENTIAL HYPERTENSION: ICD-10-CM

## 2020-03-13 DIAGNOSIS — E78.2 MIXED HYPERLIPIDEMIA: ICD-10-CM

## 2020-03-13 PROBLEM — R07.9 CHEST PAIN: Status: RESOLVED | Noted: 2018-04-06 | Resolved: 2020-03-13

## 2020-03-13 PROBLEM — I20.0 UNSTABLE ANGINA (HCC): Status: RESOLVED | Noted: 2018-04-06 | Resolved: 2020-03-13

## 2020-03-13 PROBLEM — E78.5 HYPERLIPIDEMIA: Status: ACTIVE | Noted: 2020-02-24

## 2020-03-13 PROCEDURE — 99214 OFFICE O/P EST MOD 30 MIN: CPT | Performed by: NURSE PRACTITIONER

## 2020-03-13 NOTE — PROGRESS NOTES
[facility] CARDIOLOGY  [address]  [facility] [address]  Phone: [phone]      Patient Name: David Austin Jr.  :1950  Age: 69 y.o.  Primary Cardiologist: Jesse Magallon MD  Encounter Provider:  COLBY Scruggs      Chief Complaint:   Chief Complaint   Patient presents with   • Follow-up     1 week Hospital         HPI  David Austin Jr. is a 69 y.o. male who presents today for one-week hospital reevaluation.     Pt has a  history significant for CAD, hypertension, hyperlipidemia, tobacco abuse.    Patient hospitalized 3/5-3/6/2020 for symptoms consistent with unstable angina.  Patient was ruled out for MI with serial troponin levels.  ECG remained unchanged.  Patient had a Lexiscan Cardiolite stress test 3/5/2020 which revealed inferior defect consistent with ischemia.  Patient subsequently underwent left diagnostic heart catheterization on 3/5/2020.  This revealed 70% distal left circumflex lesion.  Patient had successful PCI and placement of CARMEN to the distal left circumflex.  Patient was placed back on Plavix and aspirin.  Atorvastatin was increased to 40 mg daily.  Encouraged smoking cessation.    Patient has done well since discharge.  He is tolerating his medications without difficulty, he missed Plavix accidentally on one night.  He was seen by his PCP and his BP was elevated, his amlodipine was increased to 10mg daily.  He has not noted any increased edema.  He is attempting to wean from smoking. He has had one mild episode of chest pain on the date of discharge, but none since. He has not had any shortness of breath, lightheaded spells, or increased fatigue.  He states that cardiac rehab was ordered, but that he will not be able to attend secondary to transportation and distance from his home. He does walk on a regular basis and does not have any exertional symptoms. He has mild bruising at cath insertion site, but no other  "complications.       The following portions of the patient's history were reviewed and updated as appropriate: allergies, current medications, past family history, past medical history, past social history, past surgical history and problem list.      Review of Systems   Constitution: Positive for malaise/fatigue.   Cardiovascular: Positive for chest pain. Negative for leg swelling.   Respiratory: Negative for shortness of breath.    Neurological: Negative for light-headedness.   All other systems reviewed and are negative.      OBJECTIVE:     Vitals:    03/13/20 1013   BP: 122/68   BP Location: Right arm   Patient Position: Sitting   Pulse: 90   SpO2: 98%   Weight: 83.9 kg (185 lb)   Height: 157.5 cm (62\")     Body mass index is 33.84 kg/m².    Physical Exam   Constitutional: He is oriented to person, place, and time. Vital signs are normal. He appears well-developed and well-nourished.   Eyes: Conjunctivae are normal.   Neck: Carotid bruit is not present.   Cardiovascular: Normal rate, regular rhythm and normal heart sounds.   No murmur heard.  Pulmonary/Chest: Effort normal and breath sounds normal.   Abdominal: Normal appearance.   Musculoskeletal: Normal range of motion.   No pedal edema   Neurological: He is alert and oriented to person, place, and time. GCS eye subscore is 4. GCS verbal subscore is 5. GCS motor subscore is 6.   Skin: Skin is warm and dry.   Right radial cath site well healed without erythema or ecchymosis, palpable proximal and distal pulses, good capillary refill, good motor function of hand, no thrill or bruit detected, site is soft and non-tender with no hematoma, no sensory deficits noted.     Psychiatric: He has a normal mood and affect. His speech is normal and behavior is normal. Judgment and thought content normal. Cognition and memory are normal.       Procedures    Cardiac Procedures:  1. Cardiac catheterization 4/28/2015.  Placement of CARMEN to mid to distal RCA and mid " LAD.  2. Cardiac catheterization 4/8/2018.  Right dominant system.  Widely patent LAD and RCA stents.  3. Myocardial perfusion Cardiolite stress test 3/5/2020.  Medium sized, moderately severe area of ischemia in the inferior wall.  LVEF greater than 70%.  Impressions consistent with high risk study.  4. Cardiac catheterization 3/5/2020.  70% stenosis to the left circumflex.  Successful PCI with drug-eluting stent placement.  Patent LAD and RCA stents.        ASSESSMENT:      Diagnosis Plan   1. Coronary artery disease involving native coronary artery of native heart with unstable angina pectoris (CMS/[facility])     2. Mixed hyperlipidemia     3. Essential hypertension     4. Cigarette nicotine dependence without complication           PLAN OF CARE:     1. CAD.  Patient states that he has not had any further episodes of chest pain or shortness of breath since stent placement.  He is tolerating all of his medications including aspirin and Plavix without difficulty.  Patient did have 1 night where he missed a dose of Plavix.  Importance was stressed with patient on multiple occasions during the appointment about taking DAPT without missing any doses.  He will also continue with atorvastatin.  2. Hyperlipidemia.  Continue statin therapy.  3. Hypertension.  Blood pressure stable at 136/82.  Continue current regimen.  4. Nicotine dependence.  Patient states that he does want to stop smoking.  He is trying to wean down on the amount of cigarettes that he is smoking.  We discussed options such as prescribed medication or nicotine patches and he declines these options at this time stating he would like to stop on his own.  Goal stop date 30 days.  5. Keep your previously scheduled appointment with Dr. Magallon in May 2020.    David Austin Jr.  reports that he has been smoking cigarettes. He has a 150.00 pack-year smoking history. He has never used smokeless tobacco.. I have educated him on the risk of diseases from using  tobacco products such as cancer, COPD and heart diease.     I advised him to quit and he is willing to quit. We have discussed the following method/s for tobacco cessation:  Education Material Counseling [place] OTC Cessation Products Prescription Medicaiton.  Together we have set a quit date for 1 month from today.  He will follow up with me in 3 months or sooner to check on his progress.    I spent >10 minutes counseling the patient.               Discharge Medications           Accurate as of March 13, 2020 12:08 PM. If you have any questions, ask your nurse or doctor.               Continue These Medications      Instructions Start Date   amLODIPine 10 MG tablet  Commonly known as:  NORVASC   10 mg, Oral, Daily      aspirin 81 MG EC tablet   81 mg, Oral, Daily      atorvastatin 20 MG tablet  Commonly known as:  LIPITOR   40 mg, Oral, Daily      budesonide-formoterol 160-4.5 MCG/ACT inhaler  Commonly known as:  SYMBICORT   2 puffs, Inhalation, Daily PRN      clopidogrel 75 MG tablet  Commonly known as:  PLAVIX   75 mg, Oral, Daily      metoprolol succinate XL 50 MG 24 hr tablet  Commonly known as:  TOPROL-XL   50 mg, Oral, Daily      nitroglycerin 0.4 MG SL tablet  Commonly known as:  NITROSTAT   0.4 mg, Sublingual, Every 5 Minutes PRN, Take no more than 3 doses in 15 minutes.       Vitamin C 500 MG capsule   500 mg, Oral, Daily             Thank you for allowing me to participate in the care of your patient,         COLBY Scruggs  [facility]  03/13/20  10:30 AM      **Tereza Disclaimer:**  Much of this encounter note is an electronic transcription/translation of spoken language to printed text. The electronic translation of spoken language may permit erroneous, or at times, nonsensical words or phrases to be inadvertently transcribed. Although I have reviewed the note for such errors, some may still exist.

## 2020-05-19 ENCOUNTER — OFFICE VISIT (OUTPATIENT)
Dept: CARDIOLOGY | Facility: CLINIC | Age: 70
End: 2020-05-19

## 2020-05-19 VITALS — BODY MASS INDEX: 33.84 KG/M2 | HEIGHT: 62 IN

## 2020-05-19 DIAGNOSIS — E78.2 MIXED HYPERLIPIDEMIA: ICD-10-CM

## 2020-05-19 DIAGNOSIS — I10 ESSENTIAL HYPERTENSION: ICD-10-CM

## 2020-05-19 DIAGNOSIS — I25.110 CORONARY ARTERY DISEASE INVOLVING NATIVE CORONARY ARTERY OF NATIVE HEART WITH UNSTABLE ANGINA PECTORIS (HCC): Primary | ICD-10-CM

## 2020-05-19 DIAGNOSIS — F17.210 CIGARETTE NICOTINE DEPENDENCE WITHOUT COMPLICATION: ICD-10-CM

## 2020-05-19 PROCEDURE — 99442 PR PHYS/QHP TELEPHONE EVALUATION 11-20 MIN: CPT | Performed by: NURSE PRACTITIONER

## 2020-05-19 RX ORDER — GABAPENTIN 300 MG/1
CAPSULE ORAL
COMMUNITY
Start: 2020-04-13

## 2020-05-19 RX ORDER — NITROGLYCERIN 0.4 MG/1
0.4 TABLET SUBLINGUAL
Qty: 25 TABLET | Refills: 2 | Status: SHIPPED | OUTPATIENT
Start: 2020-05-19 | End: 2021-07-08 | Stop reason: SDUPTHER

## 2020-05-19 RX ORDER — HYDROCODONE BITARTRATE AND ACETAMINOPHEN 7.5; 325 MG/1; MG/1
TABLET ORAL
COMMUNITY
End: 2021-07-08

## 2020-05-19 NOTE — PROGRESS NOTES
[facility] CARDIOLOGY  [address]  [facility] [address]  Phone: [phone]      Patient Name: David Austin Jr.  :1950  Age: 69 y.o.  Primary Cardiologist: Jesse Magallon MD  Encounter Provider:  COLBY Scruggs      Chief Complaint:   Chief Complaint   Patient presents with   • Follow-up     telephone         HPI  David Austin Jr. is a 69 y.o. male who presents today via telephone visit secondary to COVID pandemic. Patient presents today for reevaluation.     Pt has a  history significant for CAD, hypertension, hyperlipidemia, tobacco abuse.    Patient hospitalized 3/5-3/6/2020 for symptoms consistent with unstable angina.  Patient was ruled out for MI with serial troponin levels.  ECG remained unchanged.  Patient had a Lexiscan Cardiolite stress test 3/5/2020 which revealed inferior defect consistent with ischemia.  Patient subsequently underwent left diagnostic heart catheterization on 3/5/2020.  This revealed 70% distal left circumflex lesion.  Patient had successful PCI and placement of CARMEN to the distal left circumflex.  Patient was placed back on Plavix and aspirin.  Atorvastatin was increased to 40 mg daily.  Encouraged smoking cessation.    Patient reports that he has done well over the past 6 months.  He states that his blood pressure has been controlled.  He is tolerating all medications without complications.  He states that he has been asymptomatic and denies any chest pain, shortness of breath, lightheadedness, palpitations, lower extremity edema, fatigue.  Patient is still attempting to wean from cigarettes.  He states that he does not want any medications to help him from stopping to smoke.      The following portions of the patient's history were reviewed and updated as appropriate: allergies, current medications, past family history, past medical history, past social history, past surgical history and problem  "list.      Review of Systems   Constitution: Negative for malaise/fatigue.   Cardiovascular: Negative for chest pain and leg swelling.   Respiratory: Negative for shortness of breath.    Neurological: Negative for light-headedness.   All other systems reviewed and are negative.      OBJECTIVE:     Vitals:    05/19/20 1346   Height: 157.5 cm (62\")     Body mass index is 33.84 kg/m².    Physical Exam  Physical exam not performed secondary to telephone visit.    Procedures    Cardiac Procedures:  1. Cardiac catheterization 4/28/2015.  Placement of CARMEN to mid to distal RCA and mid LAD.  2. Cardiac catheterization 4/8/2018.  Right dominant system.  Widely patent LAD and RCA stents.  3. Myocardial perfusion Cardiolite stress test 3/5/2020.  Medium sized, moderately severe area of ischemia in the inferior wall.  LVEF greater than 70%.  Impressions consistent with high risk study.  4. Cardiac catheterization 3/5/2020.  70% stenosis to the left circumflex.  Successful PCI with drug-eluting stent placement.  Patent LAD and RCA stents.    ASSESSMENT:     No diagnosis found.      PLAN OF CARE:     1. CAD.  Patient states that he has not had any further episodes of chest pain or shortness of breath since stent placement.  He is tolerating all of his medications including aspirin and Plavix without difficulty.   He will continue atorvastatin.  2. Hyperlipidemia.  Continue statin therapy.  3. Hypertension.  Blood pressure stable.  Continue current regimen.  4. Nicotine dependence.  Patient states that he does want to stop smoking.  He is trying to wean down on the amount of cigarettes that he is smoking.  We discussed options such as prescribed medication or nicotine patches and he declines these options at this time stating he would like to stop on his own.  Goal stop date 30 days.    This patient has consented to a telehealth visit via telephone. I spent  13 minutes in total including but not limited to the direct conversation " with this patient. All vitals recorded within this visit are reported by the patient.    David Austin Jr.  reports that he has been smoking cigarettes. He has a 150.00 pack-year smoking history. He has never used smokeless tobacco.. I have educated him on the risk of diseases from using tobacco products such as cancer, COPD and heart diease.     I advised him to quit and he is willing to quit. We have discussed the following method/s for tobacco cessation:  [place].  Together we have set a quit date for 1 month from today.  He will follow up with me in 4 months or sooner to check on his progress.    I spent 10 minutes counseling the patient.                Discharge Medications           Accurate as of May 19, 2020  2:02 PM. If you have any questions, ask your nurse or doctor.               Continue These Medications      Instructions Start Date   amLODIPine 10 MG tablet  Commonly known as:  NORVASC   10 mg, Oral, Daily      aspirin 81 MG EC tablet   81 mg, Oral, Daily      atorvastatin 20 MG tablet  Commonly known as:  LIPITOR   40 mg, Oral, Daily      budesonide-formoterol 160-4.5 MCG/ACT inhaler  Commonly known as:  SYMBICORT   2 puffs, Inhalation, Daily PRN      clopidogrel 75 MG tablet  Commonly known as:  PLAVIX   75 mg, Oral, Daily      gabapentin 300 MG capsule  Commonly known as:  NEURONTIN   TAKE 1 CAPSULE BY MOUTH THREE TIMES A DAY AS DIRECTED      HYDROcodone-acetaminophen 7.5-325 MG per tablet  Commonly known as:  NORCO   hydrocodone 7.5 mg-acetaminophen 325 mg tablet      metoprolol succinate XL 50 MG 24 hr tablet  Commonly known as:  TOPROL-XL   50 mg, Oral, Daily      nitroglycerin 0.4 MG SL tablet  Commonly known as:  NITROSTAT   0.4 mg, Sublingual, Every 5 Minutes PRN, Take no more than 3 doses in 15 minutes.       Vitamin C 500 MG capsule   500 mg, Oral, Daily             Thank you for allowing me to participate in the care of your patient,         COLBY Scruggs  [facility]  05/19/20  2:02 PM      **Tereza Disclaimer:**  Much of this encounter note is an electronic transcription/translation of spoken language to printed text. The electronic translation of spoken language may permit erroneous, or at times, nonsensical words or phrases to be inadvertently transcribed. Although I have reviewed the note for such errors, some may still exist.

## 2020-06-10 ENCOUNTER — TELEPHONE (OUTPATIENT)
Dept: CARDIAC REHAB | Facility: HOSPITAL | Age: 70
End: 2020-06-10

## 2020-06-10 NOTE — TELEPHONE ENCOUNTER
Attempted to call patient again to set up outpatient cardiac rehab.  Unable to contact patient.  No VM has been set up in which to leave a message.

## 2020-06-15 ENCOUNTER — TELEPHONE (OUTPATIENT)
Dept: CARDIAC REHAB | Facility: HOSPITAL | Age: 70
End: 2020-06-15

## 2020-06-15 NOTE — TELEPHONE ENCOUNTER
"Tried again to contact him for scheduling cardiac rehab.  No way to leave VM.  No \"mail box has been set up yet\".    "

## 2020-10-06 DIAGNOSIS — E78.2 MIXED HYPERLIPIDEMIA: Primary | ICD-10-CM

## 2020-10-06 RX ORDER — ATORVASTATIN CALCIUM 20 MG/1
TABLET, FILM COATED ORAL
Qty: 180 TABLET | Refills: 3 | Status: SHIPPED | OUTPATIENT
Start: 2020-10-06 | End: 2021-07-08 | Stop reason: SDUPTHER

## 2020-10-13 ENCOUNTER — OFFICE VISIT (OUTPATIENT)
Dept: NEUROLOGY | Facility: CLINIC | Age: 70
End: 2020-10-13

## 2020-10-13 VITALS
DIASTOLIC BLOOD PRESSURE: 88 MMHG | WEIGHT: 194 LBS | HEIGHT: 62 IN | HEART RATE: 77 BPM | OXYGEN SATURATION: 99 % | BODY MASS INDEX: 35.7 KG/M2 | SYSTOLIC BLOOD PRESSURE: 138 MMHG

## 2020-10-13 DIAGNOSIS — R25.1 TREMOR: Primary | ICD-10-CM

## 2020-10-13 PROCEDURE — 99204 OFFICE O/P NEW MOD 45 MIN: CPT | Performed by: PSYCHIATRY & NEUROLOGY

## 2020-10-13 RX ORDER — CHLORAL HYDRATE 500 MG
CAPSULE ORAL
COMMUNITY

## 2020-10-13 NOTE — PROGRESS NOTES
Subjective:     Patient ID: David Austin Jr. is a 69 y.o. male.    Mr. Austin is a 69-year-old left-handed male with a history of hypertension, hyperlipidemia, coronary artery disease status post MI and stents, COPD, arthritis, emphysema, hearing loss, and anxiety who presents to the neurology clinic today as new patient for evaluation of tremor.  I reviewed the patient's records.  I reviewed the referring providers note from July 23, 2020.  It reports that the patient has shaking in the arms and abdominal area for the past few months on and off.  It is with activity or rest.  Neurology referral was made.  Reviewed patient's labs.  In 2015 his TSH was normal.    Tremors started 6 months ago.  Gets tremor in left hand and stomach.  Not in right hand, LE, head, or voice.  Not associated with any med changes.  No treatment.  No progression over time.  Tremor is at rest.      Drug/toxin exposure: no  Exacerbating factors: no  Relieving factors: no  Any accompanying pulling sensations or pain? no  Walking or balance difficulties, falls: no  Stooped posture: no  Slowness of movements, decreased facial expression: slowed movements  Stiffness, decreased ROM at shoulder, difficulty turning in bed at night: no  Loss of smell: no  Small handwriting: no  Acting out dreams: No bed partner, has never fallen out of the bed  Constipation: no  Voice volume: no  Memory loss: yes  Increased drooling or saliva production: no  Choking or dysphagia: no  Hallucinations: no  Family history: no  Day to day impairment:  no  Orthostasis: yes  Improved with EtOH? No, quite years ago (38)      The following portions of the patient's history were reviewed and updated as appropriate: allergies, current medications, past family history, past medical history, past social history, past surgical history and problem list.    Review of Systems   Constitutional: Negative for activity change, appetite change and fatigue.   HENT: Positive for hearing  loss. Negative for ear pain and tinnitus.    Eyes: Negative for photophobia, pain and visual disturbance.   Respiratory: Negative for cough, chest tightness and shortness of breath.    Cardiovascular: Negative for chest pain, palpitations and leg swelling.   Endocrine: Negative for cold intolerance, heat intolerance and polydipsia.   Musculoskeletal: Positive for gait problem. Negative for back pain and neck pain.   Allergic/Immunologic: Negative for environmental allergies, food allergies and immunocompromised state.   Neurological: Positive for dizziness, tremors (left hand and sometimes in stomach), weakness and numbness. Negative for seizures, syncope, facial asymmetry, speech difficulty, light-headedness and headaches.   Hematological: Negative for adenopathy. Does not bruise/bleed easily.   Psychiatric/Behavioral: Positive for confusion. Negative for agitation, behavioral problems, decreased concentration, dysphoric mood, hallucinations, self-injury, sleep disturbance and suicidal ideas. The patient is not nervous/anxious and is not hyperactive.     I reviewed the ROS documented by the MA.  All other systems negative.      Objective:    Neurologic Exam    Physical Exam   **The patient is wearing a mask**  Constitutional:  Vital signs reviewed.  No apparent distress.  Well groomed.  Eyes:  No injection, no icterus.    Respiratory:  Normal effort.  Clear to auscultation bilaterally.  Cardiovascular:  Regular rate and rhythm.  No murmurs.  No carotid bruits. Symmetric radial pulses.  Musculoskeletal: Normal station.  Gait steady.  Normal arm swing.  Romberg negative.  Muscle tone and bulk normal in the bilateral upper and lower extremities.  Strength is 5/5 in the bilateral upper and lower extremities proximally and distally unless otherwise specified in the neurological exam.  Skin:  No rashes.  Warm, dry, and intact.  Psychiatric:  Good mood.  Normal affect.    Neurologic:  Mental status-  The patient is alert  and oriented to person, place and time. Attention/concentration is within normal limits.  Speech is fluent without dysarthria.  The patient is able to name, repeat and follow complex commands without difficulty.  Immediate memory intact.  The patient recalled 2 out of 3 words after 4 minutes.  Fund of knowledge normal.  Cranial nerves- Pupils equally round and reactive to light with intact accomodation.  Visual fields intact.  Extraocular movements intact.  Facial sensation intact.   Hearing decreased to finger-rub bilaterally.  SCM and trapezius are 5/5 bilaterally.    Motor-  See musculoskeletal above.  Mild postural tremor in the left upper extremity.  This is also noted with action when pouring water between 2 cups.  Spirals are scanned in.  Reflexes- 2+ in the bilateral biceps, brachioradialis, patellar and achilles.  Toes down-going bilaterally.  Sensation-decreased to pinprick in the left upper extremity.  Coordination- Intact to finger tapping and heel knee shin bilaterally.   Gait- See musculoskeletal exam above.           Assessment/Plan:    Mr. Austin is a 69-year-old left-handed male with history of hypertension, hyperlipidemia, coronary artery disease status post MI and stents, COPD, arthritis, emphysema, hearing loss, anxiety who presents today for evaluation of tremor.    1.  Tremor-The patient has very minimal action tremor on exam today.  It is not currently preventing him from doing anything.  For further evaluation I would like to check his thyroid.  It is possibly related to his inhaler.  He could have a very mild essential tremor versus an enhanced physiological tremor.  I recommend watching it over time.  We can see him back in 6 months or sooner if needed.       Problems Addressed this Visit     None      Visit Diagnoses     Tremor    -  Primary    Relevant Orders    TSH Rfx On Abnormal To Free T4      Diagnoses       Codes Comments    Tremor    -  Primary ICD-10-CM: R25.1  ICD-9-CM: 781.0

## 2020-12-07 RX ORDER — CLOPIDOGREL BISULFATE 75 MG/1
75 TABLET ORAL DAILY
Qty: 90 TABLET | Refills: 0 | Status: SHIPPED | OUTPATIENT
Start: 2020-12-07 | End: 2020-12-30 | Stop reason: SDUPTHER

## 2020-12-28 ENCOUNTER — TELEPHONE (OUTPATIENT)
Dept: CARDIOLOGY | Facility: CLINIC | Age: 70
End: 2020-12-28

## 2020-12-28 NOTE — TELEPHONE ENCOUNTER
Called and spoke with patient to inform that [facility] has switched to telehealth due to covid. Pt agreed to telephone visit and I changed his appointment.

## 2020-12-30 ENCOUNTER — OFFICE VISIT (OUTPATIENT)
Dept: CARDIOLOGY | Facility: CLINIC | Age: 70
End: 2020-12-30

## 2020-12-30 VITALS — WEIGHT: 171 LBS | HEIGHT: 63 IN | BODY MASS INDEX: 30.3 KG/M2

## 2020-12-30 DIAGNOSIS — F17.210 CIGARETTE NICOTINE DEPENDENCE WITHOUT COMPLICATION: ICD-10-CM

## 2020-12-30 DIAGNOSIS — E78.2 MIXED HYPERLIPIDEMIA: ICD-10-CM

## 2020-12-30 DIAGNOSIS — I25.110 CORONARY ARTERY DISEASE INVOLVING NATIVE CORONARY ARTERY OF NATIVE HEART WITH UNSTABLE ANGINA PECTORIS (HCC): Primary | ICD-10-CM

## 2020-12-30 DIAGNOSIS — I10 ESSENTIAL HYPERTENSION: ICD-10-CM

## 2020-12-30 PROCEDURE — 99442 PR PHYS/QHP TELEPHONE EVALUATION 11-20 MIN: CPT | Performed by: NURSE PRACTITIONER

## 2020-12-30 RX ORDER — CLOPIDOGREL BISULFATE 75 MG/1
75 TABLET ORAL DAILY
Qty: 90 TABLET | Refills: 0 | Status: SHIPPED | OUTPATIENT
Start: 2020-12-30 | End: 2021-06-08

## 2020-12-30 NOTE — PROGRESS NOTES
"    [facility] CARDIOLOGY  [address]  [address]  [facility] [address]  Phone: [phone]      Patient Name: David Austin Jr.  :1950  Age: 70 y.o.  Primary Cardiologist: Jesse Magallon MD  Encounter Provider:  COLBY Scruggs      Chief Complaint:   Chief Complaint   Patient presents with   • Coronary Artery Disease     FOLLOW UP   • Hypertension         Coronary Artery Disease  Pertinent negatives include no chest pain, leg swelling or shortness of breath.   Hypertension  Pertinent negatives include no chest pain, malaise/fatigue or shortness of breath.     David Austin Jr. is a 70 y.o. male who presents today via telephone visit secondary to COVID pandemic. Patient presents today for reevaluation.     Pt has a  history significant for CAD, hypertension, hyperlipidemia, tobacco abuse.    Patient reports that he has done very well over the past 6 months.  He states that he has been asymptomatic and denies any chest discomfort, shortness of breath, dyspnea with exertion, lower extremity edema, lightheadedness, palpitations, fatigue.  At secondary to the pandemic he has been unable to exercise.  He also reports that he has not stopped smoking and at this point does not have any desire to stop smoking.  He is compliant with all medications and denies any adverse effects.      The following portions of the patient's history were reviewed and updated as appropriate: allergies, current medications, past family history, past medical history, past social history, past surgical history and problem list.      Review of Systems   Constitution: Negative for malaise/fatigue.   Cardiovascular: Negative for chest pain and leg swelling.   Respiratory: Negative for shortness of breath.    Neurological: Negative for light-headedness.   All other systems reviewed and are negative.      OBJECTIVE:     Vitals:    20 0913   Weight: 77.6 kg (171 lb)   Height: 160 cm (63\") "     Body mass index is 30.29 kg/m².    Physical Exam  Physical exam not performed secondary to telephone visit.    Procedures    Cardiac Procedures:  1. Cardiac catheterization 4/28/2015.  Placement of CARMEN to mid to distal RCA and mid LAD.  2. Cardiac catheterization 4/8/2018.  Right dominant system.  Widely patent LAD and RCA stents.  3. Myocardial perfusion Cardiolite stress test 3/5/2020.  Medium sized, moderately severe area of ischemia in the inferior wall.  LVEF greater than 70%.  Impressions consistent with high risk study.  4. Cardiac catheterization 3/5/2020.  70% stenosis to the left circumflex.  Successful PCI with drug-eluting stent placement.  Patent LAD and RCA stents.    ASSESSMENT:      Diagnosis Plan   1. Coronary artery disease involving native coronary artery of native heart with unstable angina pectoris (CMS/HCC)     2. Mixed hyperlipidemia     3. Essential hypertension     4. Cigarette nicotine dependence without complication           PLAN OF CARE:     1. CAD.   Patient is asymptomatic and denies any angina or dyspnea.  He will continue GDMT with aspirin, Plavix, metoprolol succinate, amlodipine.  Patient does unfortunately continue to smoke and was encouraged to stop smoking.  2. Hyperlipidemia.  Continue atorvastatin 20 mg/day.  Patient reports that this was reduced from 40 mg/day by his PCP.  He states that he did have a cholesterol panel at his PCPs office.  I will attempt to obtain the results for our documentation.  3. Hypertension.    Patient states that blood pressure has been controlled at physician appointments.  He has been unable to check this at home.  We will continue his current regimen of metoprolol succinate 50 mg/day and amlodipine 10 mg/day.  4. Nicotine dependence.  Patient continues to smoke.  He was encouraged to stop smoking.  Risk and benefits of smoking were explained to patient.  5. Follow-up with Dr. Magallon in 6 months.  Sooner for problems or  complications.    This patient has consented to a telehealth visit via telephone. I spent  13 minutes in total including but not limited to the direct conversation with this patient. All vitals recorded within this visit are reported by the patient.                    Discharge Medications          Accurate as of December 30, 2020  9:23 AM. If you have any questions, ask your nurse or doctor.            Continue These Medications      Instructions Start Date   amLODIPine 10 MG tablet  Commonly known as: NORVASC   10 mg, Oral, Daily      ascorbic acid 1000 MG tablet  Commonly known as: VITAMIN C   500 mg, Oral, Daily      aspirin 81 MG EC tablet   81 mg, Oral, Daily      atorvastatin 20 MG tablet  Commonly known as: LIPITOR   TAKE 2 TABLETS BY MOUTH EVERY DAY      budesonide-formoterol 160-4.5 MCG/ACT inhaler  Commonly known as: SYMBICORT   2 puffs, Inhalation, Daily PRN      clopidogrel 75 MG tablet  Commonly known as: PLAVIX   75 mg, Oral, Daily, NEEDS TO MAKE AN APPOINTMENT TO RECEIVE FURTHER REFILLS      fish oil 1000 MG capsule capsule   Oral, Daily With Breakfast      gabapentin 300 MG capsule  Commonly known as: NEURONTIN   TAKE 1 CAPSULE BY MOUTH THREE TIMES A DAY AS DIRECTED      HYDROcodone-acetaminophen 7.5-325 MG per tablet  Commonly known as: NORCO   hydrocodone 7.5 mg-acetaminophen 325 mg tablet      metoprolol succinate XL 50 MG 24 hr tablet  Commonly known as: TOPROL-XL   50 mg, Oral, Daily      nitroglycerin 0.4 MG SL tablet  Commonly known as: NITROSTAT   0.4 mg, Sublingual, Every 5 Minutes PRN, Take no more than 3 doses in 15 minutes.       Potassium 99 MG tablet   Oral, Daily             Thank you for allowing me to participate in the care of your patient,         COLBY Scruggs  [facility]  12/30/20  2:02 PM      **Tereza Disclaimer:**  Much of this encounter note is an electronic transcription/translation of spoken language to printed text. The electronic translation of  spoken language may permit erroneous, or at times, nonsensical words or phrases to be inadvertently transcribed. Although I have reviewed the note for such errors, some may still exist.

## 2021-04-16 ENCOUNTER — OFFICE VISIT (OUTPATIENT)
Dept: NEUROLOGY | Facility: CLINIC | Age: 71
End: 2021-04-16

## 2021-04-16 VITALS
OXYGEN SATURATION: 98 % | HEIGHT: 63 IN | WEIGHT: 175 LBS | DIASTOLIC BLOOD PRESSURE: 68 MMHG | SYSTOLIC BLOOD PRESSURE: 108 MMHG | BODY MASS INDEX: 31.01 KG/M2 | HEART RATE: 100 BPM

## 2021-04-16 DIAGNOSIS — R25.1 TREMOR: Primary | ICD-10-CM

## 2021-04-16 PROCEDURE — 99213 OFFICE O/P EST LOW 20 MIN: CPT | Performed by: PSYCHIATRY & NEUROLOGY

## 2021-04-16 NOTE — PROGRESS NOTES
Subjective:     Patient ID: David Austin Jr. is a 70 y.o. male.    Mr. Austin is a 70-year-old left-handed male with history of hypertension, hyperlipidemia, coronary artery disease status post MI and stents, COPD, arthritis, emphysema, hearing loss, anxiety who presents to the neurology clinic today as an established patient for follow-up for tremor.  The patient was last seen as a new patient October 13, 2020.  The patient reported that his tremor had started 6 months prior.  It was mainly in the left hand and he felt it in his stomach as well.  He reports some slow movements, some memory loss, and orthostasis.  At his last visit, the patient had very minimal action tremor.  It was not preventing him from doing anything.  I thought it could be related to his inhaler and or could be a mild essential tremor versus enhanced physiologic tremor I want to check his thyroid however that lab was not done.  Today he reports that his symptoms are intermittent he still has it but is about the same.  It does not affect his functioning and he denies any new symptoms.    The following portions of the patient's history were reviewed and updated as appropriate: allergies, current medications, past family history, past medical history, past social history, past surgical history and problem list.    Review of Systems     Objective:    Neurologic Exam    Physical Exam     The patient has a very minimal postural tremor in his upper extremities.  No bradykinesia or rigidity is appreciated.  His spirals are scanned in and look similar to his previous exam.    Assessment/Plan:    Mr. Austin is a 70-year-old left-handed male with history of hypertension, hyperlipidemia, coronary artery disease status post MI and stents, COPD, arthritis, emphysema, hearing loss, and anxiety who presents today for follow-up.    1.  Tremor-the patient continues to have very minimal tremor on his exam.  There is no rigidity or bradykinesia to suggest a  parkinsonism.  The patient may have a mild essential tremor versus an enhanced physiologic tremor.  I still would like to get a TSH for further evaluation.  Since the tremor is not preventing him from doing anything, we can hold off on any treatment at this time.  In the future, we could consider switching his metoprolol to propranolol if okay with the prescribing provider.    The patient will follow up with 1 the nurse practitioners in 6 months time or sooner if needed.    A total of 20 minutes of time was spent on this encounter today.  This included reviewing the patient's records, face-to-face time, and documentation.    **We received blood work that was done on April 20, 2021.  The patient's TSH was normal.       Problems Addressed this Visit     None      Visit Diagnoses     Tremor    -  Primary    Relevant Orders    TSH Rfx On Abnormal To Free T4      Diagnoses       Codes Comments    Tremor    -  Primary ICD-10-CM: R25.1  ICD-9-CM: 781.0

## 2021-05-11 DIAGNOSIS — R25.1 TREMOR: ICD-10-CM

## 2021-06-08 RX ORDER — CLOPIDOGREL BISULFATE 75 MG/1
TABLET ORAL
Qty: 90 TABLET | Refills: 0 | Status: SHIPPED | OUTPATIENT
Start: 2021-06-08 | End: 2021-07-08 | Stop reason: SDUPTHER

## 2021-06-08 NOTE — TELEPHONE ENCOUNTER
Adult Medicine Teaching Service / Internal Medicine Teaching Service (IMTS/AMTS)  Discharge Summary   Patient: Maximus Sanchez Discharge Date: 4/23/2020 \[facility]\"" Hospital: [facility]   YOB: 1961 Admission Date: 4/21/2020 \[facility]\"" Senior: Ismamichael Biswas DO   Medical Record Number: [id number] Admission Length: 0 Days \[facility]\"" Team Color: GOLD   Admitting Physician: Amanda Vuong MD Discharge Physician: Amanda Vuong MD Outpatient Primary Care Provider: No Pcp     Admission Information     Admission Diagnoses:   Hyperglycemia [R73.9] Primary Discharge Diagnoses:   T2DM  Hypertensive Urgency  CKD3 Secondary Discharge Diagnoses:   Patient Active Problem List   Diagnosis   • Bipolar I disorder, most recent episode (or current) depressed, unspecified   • Chronic hepatitis C (CMS/HCC)   • Positive PPD   • GERD (gastroesophageal reflux disease)   • Hypertension   • Diabetes mellitus (CMS/HCC)   • Environmental allergies   • Chronic cough   • H/O noncompliance with medical treatment, presenting hazards to health   • CKD (chronic kidney disease), stage III (CMS/HCC)   • Trigger finger of left thumb   • Trigger finger of right thumb        Dispo: Home      Hospital Course   Maximus Sanchez is a 59 year old male who presented with a chief complaint of polyuria and polydipsia presents to the hospital and was found to have a blood glucose in the 700. Patient has a history of type 2 diabetes, CKD, and hypertension.  He had moved to [place] 4 years ago and has subsequently moved back to [place] 1 year ago.  He does not have any medications at home and has not been taking any medications for over a year.  Patient's blood pressure on admission was elevated (hypertensive urgency), he was treated with p.r.n. labetalol and hydralazine and then transitioned over to amlodipine and lisinopril on discharge    On admission patient was not in DKA anion gap was within normal limits.  He was started on insulin drip  LOV with Linda 12/30/20  Next appt with Linda 7/8/21  Labs 3/6/20 and subsequently transitioned to Lantus 25 units nightly and lispro a correction dose.  Patient's hemoglobin A1 c was 16.6.  Patient on day 2 of admission was not feeling well, he had myalgias, likely secondary to low potassium which was corrected.  We monitored the patient for 24 hours and he was feeling better the day of discharge.     The patient's creatinine was elevated 1.5 throughout the admission we believe this is the patient's new baseline creatinine.  This is better than the last recorded creatinine which was in 2017 which was 1.7.  The patient will be discharged with Lantus and Humalog as well as a device kit and strips.  Patient knows how to check his own glucose and administer insulin as he was on it years before.  He will also be discharged on lisinopril and amlodipine for blood pressure control.    We were able to get an in-person appointment to establish care and have a follow up with Dr. Hogan on Tuesday April 28th at 3PM.  We recommended outpatient follow-up with endocrinology as well for elevated A1c.    Consultants:    Physical Exam     Visit Vitals  /72 (BP Location: LUE - Left upper extremity, Patient Position: Supine)   Pulse 84   Temp 98.1 °F (36.7 °C) (Oral)   Resp 16   Ht 5' 6\" (1.676 m)   Wt 58.1 kg   SpO2 99%   BMI 20.67 kg/m²       General: No appreciable disease, comfortable appearing male.   HEENT:    PERRL, no scleral icterus or conjunctival pallor, no nasal discharge   Cardiovascular:   RRR, no murmurs/rubs/gallops, S1/S2 normal, no JVD, no bilateral lower extremity edema   Respiratory:   Clear to auscultation bilaterally, no retractions or increased work of breathing   GI:   Positive bowel sounds, soft, nontender/non-distended   Musculoskeletal:  No clubbing, edema, or cyanosis.   Skin:   No rashes/lesions/ecchymoses/jaundice.   Neurologic:  Alert and oriented x3. EOMI. No gross motor or sensory deficits. No facial droop or dysarthria.   Psychiatric:   Appropriate  mood/affect.     Wt Readings from Last 1 Encounters:   04/21/20 58.1 kg       Recommendations - Instructions - Follow-up   ED Advisories (Hx of violent behavior/ AMA/ Multiple hospital visits- if YES, then route note to ED Case Manager: [email or web address]): NA    Outpatient Follow-up Diagnostics/Recommendations (advised post discharge diagnostic studies/ INR range/ DVT/ Stent hx/ Coagulopathies/ etc.): NA    Diet: Consistent Carb Moderate (45-75 Gm/meal) Diet  Daily W Breakfast; High Protein, Low Carbohydrate Supplement, Chocolate Oral Nutrition Supplement  Daily W Dinner; High Protein, Low Carbohydrate Supplement, Vanilla Oral Nutrition Supplement  Activity:  Activity as Tolerated Wound Care: None Needed     Follow-up Providers/Appointments:  Percy Rosenthal MD  [address] [facility] [address]  [phone]    Go on 4/28/2020  Please go to your IN PERSON appointment at 3PM for hospital follow up and estbalishing care.       Medications        Summary of your Discharge Medications      Take these Medications      Details   amLODIPine 5 MG tablet  Commonly known as:  NORVASC   Take 1 tablet by mouth daily.     aspirin 81 MG tablet   Take 1 tablet by mouth daily.     atorvastatin 20 MG tablet  Commonly known as:  LIPITOR   Take 1 tablet by mouth daily.     FreeStyle Lite Device   Test blood sugar 3 times daily.     FREESTYLE LITE test strip   Generic drug:  blood glucose  Test blood sugar 3 times daily.  Comment:  type 2 diabetes on insulin E11.65 must use alternate site testing     freestyle Misc   Test blood sugar 3 times daily  Comment:  type 2 diabetes on insulin E11.65     insulin glargine 100 UNIT/ML pen-injector  Commonly known as:  Lantus SoloStar   Inject 20 Units into the skin nightly. Prime 2 units before each dose.     Insulin Lispro (1 Unit Dial) 100 UNIT/ML pen-injector  Commonly known as:  HumaLOG KwikPen   Inject 5 Units into the skin 3 times daily (before meals). Do not use if you are not  eating meal. Prime 2 units before each dose.     Insulin Pen Needle 32G X 4 MM Misc   1 each 4 times daily.     lisinopril 20 MG tablet  Commonly known as:  ZESTRIL  Start taking on:  April 24, 2020   Take 1 tablet by mouth daily. Do not start before April 24, 2020.            CMS Best Practices - MI - HF - STROKE                    Quality Indicators     AMI With Heart Failure with Reduced LVEF (<40%)? no  Heart failure?  No  Stroke measures indicated? no  AMI? No  DVT/VTE Prophylaxis:  VTE Pharmacologic Prophylaxis: Yes  VTE Mechanical Prophylaxis: Yes  Severe sepsis with septic shock?  No      ____________________________  Isma Biswas, DO PGY-2   4/23/2020 10:25 AM  Pager: [phone]    This patient's case was discussed with attending physician, Dr. Amanda Vuong MD. Please see below or additional note for addendum.     Attending Addendum:   IM ADDENDUM  Patient seen and examined with the team. Labs and chart reviewed. Reviewed the discharge document and agree with it. Discussed discharge instructions with the patient and patient shows understanding and is in agreement.  Amanda GILL/[facility] DC Summary; Dept. of Internal Medicine JAMES/ JAIRO. Rev. 5.8; 9/23/15.   Support/ Technical Difficulties: [email or web address]

## 2021-07-08 ENCOUNTER — OFFICE VISIT (OUTPATIENT)
Dept: CARDIOLOGY | Facility: CLINIC | Age: 71
End: 2021-07-08

## 2021-07-08 VITALS
HEIGHT: 63 IN | OXYGEN SATURATION: 97 % | WEIGHT: 177 LBS | HEART RATE: 71 BPM | DIASTOLIC BLOOD PRESSURE: 68 MMHG | BODY MASS INDEX: 31.36 KG/M2 | SYSTOLIC BLOOD PRESSURE: 106 MMHG

## 2021-07-08 DIAGNOSIS — F17.210 CIGARETTE NICOTINE DEPENDENCE WITHOUT COMPLICATION: ICD-10-CM

## 2021-07-08 DIAGNOSIS — I25.110 CORONARY ARTERY DISEASE INVOLVING NATIVE CORONARY ARTERY OF NATIVE HEART WITH UNSTABLE ANGINA PECTORIS (HCC): Primary | ICD-10-CM

## 2021-07-08 DIAGNOSIS — E78.2 MIXED HYPERLIPIDEMIA: ICD-10-CM

## 2021-07-08 DIAGNOSIS — I10 ESSENTIAL HYPERTENSION: ICD-10-CM

## 2021-07-08 PROCEDURE — 93000 ELECTROCARDIOGRAM COMPLETE: CPT | Performed by: NURSE PRACTITIONER

## 2021-07-08 PROCEDURE — 99214 OFFICE O/P EST MOD 30 MIN: CPT | Performed by: NURSE PRACTITIONER

## 2021-07-08 RX ORDER — ATORVASTATIN CALCIUM 20 MG/1
40 TABLET, FILM COATED ORAL DAILY
Qty: 180 TABLET | Refills: 3 | Status: SHIPPED | OUTPATIENT
Start: 2021-07-08

## 2021-07-08 RX ORDER — CLOPIDOGREL BISULFATE 75 MG/1
75 TABLET ORAL DAILY
Qty: 90 TABLET | Refills: 3 | Status: SHIPPED | OUTPATIENT
Start: 2021-07-08 | End: 2022-09-16 | Stop reason: ALTCHOICE

## 2021-07-08 RX ORDER — NITROGLYCERIN 0.4 MG/1
0.4 TABLET SUBLINGUAL
Qty: 25 TABLET | Refills: 2 | Status: SHIPPED | OUTPATIENT
Start: 2021-07-08 | End: 2023-03-23

## 2021-07-08 NOTE — PROGRESS NOTES
"    CARDIOLOGY        Patient Name: David Austin Jr.  :1950  Age: 70 y.o.  Primary Cardiologist: Jesse Magallon MD  Encounter Provider:  COLBY Scruggs    Date of Service: 21          CHIEF COMPLAINT / REASON FOR OFFICE VISIT     Coronary Artery Disease (6 month f/u)      HISTORY OF PRESENT ILLNESS       HPI  David Austin Jr. is a 70 y.o. male who presents today for semi-annual eval.         Pt has a  history significant for CAD, hypertension, hyperlipidemia, tobacco abuse.    Patient reports that he has been experiencing intermittent episodes of chest discomfort.  The pain is described as mid-sternal.  He states that pain occurs every 3-4 weeks and occurs at rest.  The longest episode is 10-15 minutes.  Most of the episodes are approximately 5 minutes.  Some of the episodes cause shortness of breath.  He has not had any episodes with exertion.  He has not needed any NTG for the episodes.  He exercises in the form of walking on a regular basis.  He denies lightheadedness, edema, increased fatigue.  He is tolerating medications without adverse effects.       The following portions of the patient's history were reviewed and updated as appropriate: allergies, current medications, past family history, past medical history, past social history, past surgical history and problem list.      VITAL SIGNS     Visit Vitals  /68 (BP Location: Left arm, Patient Position: Sitting, Cuff Size: Adult)   Pulse 71   Ht 160 cm (63\")   Wt 80.3 kg (177 lb)   SpO2 97%   BMI 31.35 kg/m²         Wt Readings from Last 3 Encounters:   21 80.3 kg (177 lb)   21 79.4 kg (175 lb)   20 77.6 kg (171 lb)     Body mass index is 31.35 kg/m².      REVIEW OF SYSTEMS   Review of Systems   Constitutional: Negative for chills, fever, weight gain and weight loss.   Cardiovascular: Positive for chest pain. Negative for leg swelling.   Respiratory: Positive for shortness of breath. Negative for cough, " snoring and wheezing.    Hematologic/Lymphatic: Negative for bleeding problem. Does not bruise/bleed easily.   Skin: Negative for color change.   Musculoskeletal: Negative for falls, joint pain and myalgias.   Gastrointestinal: Negative for melena.   Genitourinary: Negative for hematuria.   Neurological: Negative for excessive daytime sleepiness.   Psychiatric/Behavioral: Negative for depression. The patient is not nervous/anxious.            PHYSICAL EXAMINATION     Constitutional:       Appearance: Normal appearance. Well-developed.   Eyes:      Conjunctiva/sclera: Conjunctivae normal.   Neck:      Vascular: No carotid bruit.   Pulmonary:      Effort: Pulmonary effort is normal.      Breath sounds: Normal breath sounds.   Cardiovascular:      Normal rate. Regular rhythm. Normal S1. Normal S2.      Murmurs: There is no murmur.      No gallop. No click. No rub.   Edema:     Peripheral edema absent.   Musculoskeletal: Normal range of motion. Skin:     General: Skin is warm and dry.   Neurological:      Mental Status: Alert and oriented to person, place, and time.      GCS: GCS eye subscore is 4. GCS verbal subscore is 5. GCS motor subscore is 6.   Psychiatric:         Speech: Speech normal.         Behavior: Behavior normal.         Thought Content: Thought content normal.         Judgment: Judgment normal.           REVIEWED DATA       ECG 12 Lead    Date/Time: 7/8/2021 9:11 AM  Performed by: Sabiha Wayne APRN  Authorized by: Sabiha Wayne APRN   Comparison: compared with previous ECG from 3/6/2020  Similar to previous ECG  Rhythm: sinus rhythm  Ectopy: multifocal PVCs  Rate: normal  BPM: 70  Conduction: conduction normal  ST Segments: ST segments normal  T Waves: T waves normal  QRS axis: normal  Other: no other findings    Clinical impression: normal ECG            Cardiac Procedures:  1. Cardiac catheterization 4/28/2015.  Placement of CARMEN to mid to distal RCA and mid LAD.  2. Cardiac catheterization  4/8/2018.  Right dominant system.  Widely patent LAD and RCA stents.  3. Myocardial perfusion Cardiolite stress test 3/5/2020.  Medium sized, moderately severe area of ischemia in the inferior wall.  LVEF greater than 70%.  Impressions consistent with high risk study.  4. Cardiac catheterization 3/5/2020.  70% stenosis to the left circumflex.  Successful PCI with drug-eluting stent placement.  Patent LAD and RCA stents.          ASSESSMENT & PLAN      Diagnosis Plan   1. Coronary artery disease involving native coronary artery of native heart with unstable angina pectoris (CMS/HCC)     2. Mixed hyperlipidemia  atorvastatin (LIPITOR) 20 MG tablet   3. Essential hypertension     4. Cigarette nicotine dependence without complication           SUMMARY/DISCUSSION  1. CAD.  Patient with history of multiple stents to multiple vessels.  Reports that he has done fairly well over the past 6 months.  He does report episodes of chest discomfort that are described as both atypical and typical episodes.  He states that approximately every 3 to 4 weeks he will have an episode that lasts anywhere from 5 to 15 minutes.  Episode is described as midsternal.  Not worsened with exertion.  All symptoms occurring at rest.  Some with associated shortness of breath.  Patient and I did discuss possibility of stress imaging to further differentiate his symptoms.  At this point he would like to continue to monitor them.  He is requesting a refill of his sublingual nitroglycerin.  I have provided this but educated the patient extensively to notify the office if he starts using this frequently or if he takes more than 2 doses at once.  He verbalized understanding.  He will also notify the office if any of his chest discomfort becomes worse.  He will continue guideline directed therapy with clopidogrel, atorvastatin, metoprolol, aspirin.  No ischemic changes on ECG.  2. Hyperlipidemia.  Continue atorvastatin 40 mg/day.  3. Hypertension.  Blood  pressure controlled in office today at 106/68.  Continue metoprolol succinate 50 mg/day, amlodipine 10 mg/day.  4. Nicotine dependence.  Patient does not have desire to stop smoking.  Benefits of smoking cessation reviewed.  5. Notify the office for any worsening symptoms.  Please follow-up with Dr. Magallon in 6 months.  Sooner for problems or complications.        MEDICATIONS         Discharge Medications          Accurate as of July 8, 2021  9:40 AM. If you have any questions, ask your nurse or doctor.            Continue These Medications      Instructions Start Date   amLODIPine 10 MG tablet  Commonly known as: NORVASC   10 mg, Oral, Daily      ascorbic acid 1000 MG tablet  Commonly known as: VITAMIN C   500 mg, Oral, Daily      aspirin 81 MG EC tablet   81 mg, Oral, Daily      atorvastatin 20 MG tablet  Commonly known as: LIPITOR   40 mg, Oral, Daily      budesonide-formoterol 160-4.5 MCG/ACT inhaler  Commonly known as: SYMBICORT   2 puffs, Inhalation, Daily PRN      clopidogrel 75 MG tablet  Commonly known as: PLAVIX   75 mg, Oral, Daily      fish oil 1000 MG capsule capsule   Oral, Daily With Breakfast      gabapentin 300 MG capsule  Commonly known as: NEURONTIN   TAKE 1 CAPSULE BY MOUTH THREE TIMES A DAY AS DIRECTED      metoprolol succinate XL 50 MG 24 hr tablet  Commonly known as: TOPROL-XL   50 mg, Oral, Daily      nitroglycerin 0.4 MG SL tablet  Commonly known as: NITROSTAT   0.4 mg, Sublingual, Every 5 Minutes PRN, Take no more than 3 doses in 15 minutes.       Potassium 99 MG tablet   Oral, Daily         Stop These Medications    HYDROcodone-acetaminophen 7.5-325 MG per tablet  Commonly known as: NORCO  Stopped by: COLBY Scruggs                **Dragon Disclaimer:   Much of this encounter note is an electronic transcription/translation of spoken language to printed text. The electronic translation of spoken language may permit erroneous, or at times, nonsensical words or phrases to be  inadvertently transcribed. Although I have reviewed the note for such errors, some may still exist.

## 2021-10-29 ENCOUNTER — OFFICE VISIT (OUTPATIENT)
Dept: NEUROLOGY | Facility: CLINIC | Age: 71
End: 2021-10-29

## 2021-10-29 VITALS
HEART RATE: 109 BPM | DIASTOLIC BLOOD PRESSURE: 76 MMHG | OXYGEN SATURATION: 97 % | WEIGHT: 169 LBS | SYSTOLIC BLOOD PRESSURE: 130 MMHG | BODY MASS INDEX: 29.94 KG/M2

## 2021-10-29 DIAGNOSIS — R25.1 INTERMITTENT TREMOR: Primary | ICD-10-CM

## 2021-10-29 PROCEDURE — 99213 OFFICE O/P EST LOW 20 MIN: CPT | Performed by: NURSE PRACTITIONER

## 2022-02-16 ENCOUNTER — OFFICE VISIT (OUTPATIENT)
Dept: CARDIOLOGY | Facility: CLINIC | Age: 72
End: 2022-02-16

## 2022-02-16 VITALS
DIASTOLIC BLOOD PRESSURE: 72 MMHG | SYSTOLIC BLOOD PRESSURE: 122 MMHG | HEART RATE: 68 BPM | BODY MASS INDEX: 29.34 KG/M2 | WEIGHT: 165.6 LBS | HEIGHT: 63 IN | OXYGEN SATURATION: 98 %

## 2022-02-16 DIAGNOSIS — I10 PRIMARY HYPERTENSION: ICD-10-CM

## 2022-02-16 DIAGNOSIS — I25.10 CORONARY ARTERY DISEASE INVOLVING NATIVE CORONARY ARTERY OF NATIVE HEART WITHOUT ANGINA PECTORIS: Primary | ICD-10-CM

## 2022-02-16 PROCEDURE — 99213 OFFICE O/P EST LOW 20 MIN: CPT | Performed by: INTERNAL MEDICINE

## 2022-02-16 RX ORDER — HYDROCODONE BITARTRATE AND ACETAMINOPHEN 7.5; 325 MG/1; MG/1
TABLET ORAL
COMMUNITY
Start: 2022-02-04

## 2022-02-26 NOTE — PROGRESS NOTES
Date of Office Visit:   2022  Encounter Provider: Barak Magallon MD  Place of Service: [facility] CARDIOLOGY  Patient Name: David Austin Jr.  :1950    Chief complaint: Follow-up for coronary artery disease, hypertension.    History of Present Illness:    Dear Dr. Roman:      I again had the pleasure of seeing your patient in cardiology office on 2022.  As  you well know, he is a very pleasant 71 year-old white male with a past medical history  significant for coronary artery disease, hypertension, and hyperlipidemia who presents   for follow-up. The patient was first evaluated in our chest pain unit in the office on   2015. At that time, he had been experiencing exertional chest pressure   accompanied by significant dyspnea on exertion for approximately 1 month. His   symptoms were very concerning for unstable angina based on his description and the   nature of the progression of the symptoms. He subsequently underwent a diagnostic   left hear catheterization on 2015 by Dr. Lau. At that time, he was found to have   very significant coronary artery disease. His LAD was large and had a 70% lesion   followed by an 80% lesion in the mid segment. The right coronary artery was also   large and dominant and had diffuse 70% mid disease, as well as a 90% focal distal   stenosis followed by a 95% distal focal stenosis as well. He subsequently underwent   placement of a 3.25 x 38 mm Xience Alpine drug eluting stent overlapping with a 3.0 x   8 mm Xience Alpine drug eluting stent to cover both distal right coronary artery lesions.   He also had a placement of a 4.0 x 38 mm Xience drug eluting stent to the mid-right   coronary artery which overlapped with the distal stents. He also had placement of a   3.25 x 33 mm Xience Alpine drug eluting stent to the mid LAD at that time as well.     The patient presented on 3/5/2020 with complaints of intermittent severe  right-sided   chest pain.  He underwent a Lexiscan Cardiolite stress test on 3/5/2020 which   showed an inferior defect consistent with ischemia.  He underwent a left heart cath   on 3/5/2020 which showed a 70% distal left circumflex lesion.  He subsequently   underwent placement of a 2.5 x 15 mm Xience Lea CARMEN to the distal left   circumflex artery at that time.     The patient presents today for follow-up.  Overall, he has been doing fairly well.    Unfortunately, he does continue to smoke.  He has had bruising, although he has   not had any bleeding.  He denied any chest pain.  His shortness of breath is at   baseline, and is likely from his tobacco use.  His blood pressure is excellent at   122/72, and evidently has been running well at home as well.    Past Medical History:   Diagnosis Date   • Asthma    • Cataracts, bilateral    • Chronic back pain    • Coronary artery disease 04/28/2015    s/p 3 CARMEN to RCA and CARMEN to mid LAD on 4/28/15   • Heart attack (HCC)    • Hyperlipidemia    • Hypertension    • Memory loss        Past Surgical History:   Procedure Laterality Date   • APPENDECTOMY     • BACK SURGERY      x2   • CARDIAC CATHETERIZATION     • CARDIAC CATHETERIZATION N/A 4/8/2018    Procedure: Left Heart Cath;  Surgeon: Jose Carlton MD;  Location: [facility] CATH INVASIVE LOCATION;  Service: Cardiovascular   • CARDIAC CATHETERIZATION N/A 4/8/2018    Procedure: Coronary angiography;  Surgeon: Jose Carlton MD;  Location: [facility] CATH INVASIVE LOCATION;  Service: Cardiovascular   • CARDIAC CATHETERIZATION N/A 4/8/2018    Procedure: Left ventriculography;  Surgeon: Jose Carlton MD;  Location: [facility] CATH INVASIVE LOCATION;  Service: Cardiovascular   • CARDIAC CATHETERIZATION N/A 3/5/2020    Procedure: Left Heart Cath;  Surgeon: Sourav Cruz MD;  Location: [facility] CATH INVASIVE LOCATION;  Service: Cardiovascular;  Laterality: N/A;   • CARDIAC CATHETERIZATION N/A 3/5/2020    Procedure: Coronary angiography;   Surgeon: Sourav Cruz MD;  Location: [facility] CATH INVASIVE LOCATION;  Service: Cardiovascular;  Laterality: N/A;   • CARDIAC CATHETERIZATION N/A 3/5/2020    Procedure: Left ventriculography;  Surgeon: Sourav Cruz MD;  Location: [facility] CATH INVASIVE LOCATION;  Service: Cardiovascular;  Laterality: N/A;   • CARDIAC CATHETERIZATION N/A 3/5/2020    Procedure: Stent CARMEN coronary;  Surgeon: Sourav Cruz MD;  Location: [facility] CATH INVASIVE LOCATION;  Service: Cardiovascular;  Laterality: N/A;   • CORONARY ANGIOPLASTY  04/28/2015    A) Xience Alpine drug eluting stent [id number] overlapping with 3.0x8mm Xience alpine drug eluting stent to cover distal RCA . B) 4.0 x 38mm Xience Alpine drug eluting stent cover the mid RCA which overlaps with the distal stents. C) 3.25 x 33mm Xience Alpine drug eluting stent to mid LAD.    • SHOULDER SURGERY      right    • VASECTOMY         Current Outpatient Medications on File Prior to Visit   Medication Sig Dispense Refill   • amLODIPine (NORVASC) 10 MG tablet Take 10 mg by mouth Daily.     • ascorbic acid (VITAMIN C) 1000 MG tablet Take 500 mg by mouth Daily.     • aspirin 81 MG EC tablet Take 81 mg by mouth Daily.     • atorvastatin (LIPITOR) 20 MG tablet Take 2 tablets by mouth Daily. 180 tablet 3   • budesonide-formoterol (SYMBICORT) 160-4.5 MCG/ACT inhaler Inhale 2 puffs Daily As Needed.     • clopidogrel (PLAVIX) 75 MG tablet Take 1 tablet by mouth Daily. 90 tablet 3   • gabapentin (NEURONTIN) 300 MG capsule TAKE 1 CAPSULE BY MOUTH THREE TIMES A DAY AS DIRECTED     • HYDROcodone-acetaminophen (NORCO) 7.5-325 MG per tablet TAKE 1 TABLET 4 TIMES A DAY BY ORAL ROUTE AS NEEDED FOR 15 DAYS. (2/4)     • metoprolol succinate XL (TOPROL-XL) 50 MG 24 hr tablet Take 50 mg by mouth Daily.     • nitroglycerin (NITROSTAT) 0.4 MG SL tablet Place 1 tablet under the tongue Every 5 (Five) Minutes As Needed for Chest Pain. Take no more than 3 doses in 15 minutes. 25 tablet 2  "  • Omega-3 Fatty Acids (fish oil) 1000 MG capsule capsule Take  by mouth Daily With Breakfast.     • Potassium 99 MG tablet Take  by mouth Daily.       No current facility-administered medications on file prior to visit.     Allergies as of 02/16/2022 - Reviewed 02/16/2022   Allergen Reaction Noted   • Niacin Unknown - Low Severity 10/13/2020   • Relafen [nabumetone] Rash 04/06/2018     Social History     Socioeconomic History   • Marital status:    Tobacco Use   • Smoking status: Current Every Day Smoker     Packs/day: 2.00     Years: 50.00     Pack years: 100.00     Types: Cigarettes   • Smokeless tobacco: Never Used   • Tobacco comment: CAFFEINE USE: 1 POT COFFEE DAILY   Vaping Use   • Vaping Use: Never used   Substance and Sexual Activity   • Alcohol use: No   • Drug use: No   • Sexual activity: Defer     Family History   Problem Relation Age of Onset   • Arthritis Mother    • Arthritis Father    • Hypertension Father    • Cancer Paternal Uncle    • Diabetes Maternal Grandmother    • Coronary artery disease Neg Hx        Review of Systems   Constitutional: Positive for malaise/fatigue.   Cardiovascular: Positive for claudication and dyspnea on exertion.   Neurological: Positive for numbness and paresthesias.   All other systems reviewed and are negative.     Objective:     Vitals:    02/16/22 0924   BP: 122/72   Pulse: 68   SpO2: 98%   Weight: 75.1 kg (165 lb 9.6 oz)   Height: 160 cm (62.99\")     Body mass index is 29.34 kg/m².    Physical Exam  Constitutional:       Appearance: He is well-developed.   HENT:      Head: Normocephalic and atraumatic.   Eyes:      Conjunctiva/sclera: Conjunctivae normal.   Cardiovascular:      Rate and Rhythm: Normal rate and regular rhythm.      Heart sounds: No murmur heard.  No friction rub. No gallop.    Pulmonary:      Effort: Pulmonary effort is normal.      Breath sounds: Decreased breath sounds present.   Abdominal:      Palpations: Abdomen is soft.      Tenderness: " There is no abdominal tenderness.   Musculoskeletal:      Cervical back: Neck supple.   Skin:     General: Skin is warm.   Neurological:      Mental Status: He is alert and oriented to person, place, and time.   Psychiatric:         Behavior: Behavior normal.       Lab Review:   Procedures    Cardiac Procedures:  1. Left heart catheterization on 4/28/2015: The left main coronary artery was normal. The left   circumflex had 30% diffuse mid disease and 20% to 30% distal disease. The LAD was large   and had a 70% to 80% focal mid-stenosis. The right coronary artery was dominant and had   diffuse 70% mid disease followed by a 90% focal distal stenosis which was then followed by   another 95% more distal stenosis.   a. Status post placement of a 3.25 x 38 mm Xience Alpine drug eluting stent overlapping with   a 3.0 x 8 mm Xience Alpine drug eluting stent to cover the distal right coronary artery lesion.   b. Status post placement of a 4.0 x 38 mm Xience Alpine drug eluting stent to the mid right   coronary artery which overlaps with the distal stents.   c. Status post placement of a 3.25 x 33 mm Xience Alpine drug eluting stent to the mid LAD.   2.  CT angiogram of the chest on 4/6/2018: No pulmonary embolism or acute pathology.  3.  Left heart catheterization on 4/8/2018 by Dr. Carlton: The left main was normal.  LAD had 20%   diffuse proximal disease.  The mid LAD stent was widely patent.  OM1 had a 40% proximal   lesion.  The distal left circumflex had 50% disease.  Stents throughout the RCA were patent   with no in-stent restenosis.  The PDA had 30% diffuse disease.  4.  Left heart catheterization on 3/5/2020 by Dr. Cruz: The left main was normal.  The   LAD had a proximal stent which was widely patent.  The left circumflex had 30% disease in   the midsegment, and a 70% stenosis distally.  The RCA was large and dominant with   extensive stenting from the proximal to distal segments which were normal.  The patient    underwent placement of a 2.5 x 15 mm Xience Lea CARMEN to the distal left circumflex at that   time.       Assessment:       Diagnosis Plan   1. Coronary artery disease involving native coronary artery of native heart without angina pectoris     2. Primary hypertension       Plan:       The patient seems to be stable from a cardiac perspective.  He has had no chest discomfort.  His shortness of breath is likely from ongoing tobacco use, and has been stable.  This has not increased since his last visit.  Unfortunately, and he really does not want to quit smoking.  I have strongly encouraged this on multiple occasions, and I did discuss this again in detail with him.  I strongly recommended smoking cessation for his overall health and for his cardiovascular disease.    At this point, his last stent was placed almost 2 years ago.  He has had some easy bruising.  I am going to have him stop Plavix at this time.  He will continue on the aspirin 81 mg/day indefinitely.  He is taking Lipitor 40 mg/day currently.  I will obtain his most recent lipid panel for review.  His blood pressure is actually excellent on the amlodipine 10 mg/day, and metoprolol succinate 50 mg/day.    For now, I will see him back in 6 months.

## 2022-09-16 ENCOUNTER — OFFICE VISIT (OUTPATIENT)
Dept: CARDIOLOGY | Facility: CLINIC | Age: 72
End: 2022-09-16

## 2022-09-16 VITALS
SYSTOLIC BLOOD PRESSURE: 118 MMHG | WEIGHT: 165 LBS | HEART RATE: 61 BPM | BODY MASS INDEX: 29.23 KG/M2 | DIASTOLIC BLOOD PRESSURE: 86 MMHG | HEIGHT: 63 IN

## 2022-09-16 DIAGNOSIS — I10 PRIMARY HYPERTENSION: ICD-10-CM

## 2022-09-16 DIAGNOSIS — I25.10 CORONARY ARTERY DISEASE INVOLVING NATIVE CORONARY ARTERY OF NATIVE HEART WITHOUT ANGINA PECTORIS: Primary | ICD-10-CM

## 2022-09-16 PROCEDURE — 93000 ELECTROCARDIOGRAM COMPLETE: CPT | Performed by: NURSE PRACTITIONER

## 2022-09-16 PROCEDURE — 99214 OFFICE O/P EST MOD 30 MIN: CPT | Performed by: NURSE PRACTITIONER

## 2022-09-16 NOTE — PROGRESS NOTES
Date of Office Visit: 2022  Encounter Provider: Kim Rosas, STU, APRN  Place of Service: [facility] CARDIOLOGY  Patient Name: David Austin Jr.  :1950        Subjective:     Chief Complaint:  Coronary artery disease, hypertension      History of Present Illness:  David Austin Jr. is a 71 y.o. male patient of Dr. Magallon.  I am seeing this patient in the office today and I have reviewed his records.    Patient has a history of coronary artery disease, hypertension, hyperlipidemia.    Patient was seen in the [facility] 2015 for chest pressure and shortness of breath that had been going on for about a month.  It was concerning for unstable angina and he underwent diagnostic heart cath 2015 with Dr. Lau and found to have significant coronary artery disease with large LAD with 70% lesion followed by 80% lesion in the midsegment and RCA with 70% mid disease and 90% focal distal disease followed by 95% distal focal stenosis.  He underwent overlapping drug-eluting stents to RCA and another drug-eluting stent to RCA as well as stent to the LAD.  3/2020 completed with severe right-sided chest discomfort and had nuclear stress test 3/2020 showing inferior defect consistent with ischemia.  He underwent heart cath showing 70% distal left circumflex disease and received stent to circumflex.  He was having bruising issues 2022 when seen in the office and Plavix was stopped but aspirin was continued.  6-month follow-up was recommended.      Patient presents to office today for follow-up appointment.  Patient reports he is doing well since last visit.  He stays pretty active.  He usually does about 30 to 35 minutes of walking at least several times a week.  He denies any exertional symptoms or concerns with this.  He denies any chest pain or discomfort, shortness of breath, shortness of breath with exertion, palpitations, lower extremity edema, syncope, near syncope, falls,  fatigue, or abnormal bleeding.  He might get some occasional mild lightheadedness if he gets out of bed too quickly in the morning, this is a chronic issue, nothing new or worsening.  He does okay if he gets out of bed more slowly.  Blood pressure at home not low, stays 120s over 70s.  Tolerating aspirin well.          HEART CATH 3/5/2020:  Cineangiography:  1.  Left main: Normal  2.  LAD: The left anterior descending coronary artery proximal stent is widely patent.  The remainder of the vessel is normal.  The diagonal branches appear to be normal.  3.  LCx: The left circumflex coronary artery has a 30% stenosis in the midsegment.  There is a 70% stenosis distally.  Beyond that the terminal marginal is normal.  A large obtuse marginal arising from the proximal segment appears to be normal.  There is a small marginal arising from the mid segment that appears to be normal.  4.  RCA: The right coronary artery is a large dominant vessel.  There is extensive stent system extending from the proximal down to the distal segment that is widely patent.  The distal stent is normal.  The PDA shows luminal irregularities but no critical stenosis.  The posterior lateral branches are normal.  Left ventriculography: Overall size of the ventricle is normal.  Global contractility of the ventricles within normal limits.  Estimated blood ejection fraction was 50%.  Intervention:  1.  LCx: Initial DIANE flow 3.  Residual flow 3.  Initial stenosis 70%.  Residual stenosis 0.  Lesion type A  Assessment:  1.  Ischemic heart disease:  Etiology: Coronary atherosclerosis  Anatomy: Three-vessel coronary disease with patent LAD and RCA stents, significant stenosis in distal LCx  Physiology: Angina pectoris, inferior hypokinesia on stress test  Functional status: Mildly compromised  Recommendations: Dual antiplatelet therapy, risk factor modification          Past Medical History:   Diagnosis Date   • Asthma    • Cataracts, bilateral    • Chronic  back pain    • Coronary artery disease 04/28/2015    s/p 3 CARMEN to RCA and CARMEN to mid LAD on 4/28/15   • Heart attack (HCC)    • Hyperlipidemia    • Hypertension    • Memory loss      Past Surgical History:   Procedure Laterality Date   • APPENDECTOMY     • BACK SURGERY      x2   • CARDIAC CATHETERIZATION     • CARDIAC CATHETERIZATION N/A 4/8/2018    Procedure: Left Heart Cath;  Surgeon: Jose Carlton MD;  [facility];  Service: Cardiovascular   • CARDIAC CATHETERIZATION N/A 4/8/2018    Procedure: Coronary angiography;  Surgeon: Jose Carlton MD;  Location:  [facility] CATH INVASIVE LOCATION;  Service: Cardiovascular   • CARDIAC CATHETERIZATION N/A 4/8/2018    Procedure: Left ventriculography;  Surgeon: Jose Carlton MD;  Location:  [facility] CATH INVASIVE LOCATION;  Service: Cardiovascular   • CARDIAC CATHETERIZATION N/A 3/5/2020    Procedure: Left Heart Cath;  Surgeon: Sourav Cruz MD;  [facility];  Service: Cardiovascular;  Laterality: N/A;   • CARDIAC CATHETERIZATION N/A 3/5/2020    Procedure: Coronary angiography;  Surgeon: Sourav Cruz MD;  [facility];  Service: Cardiovascular;  Laterality: N/A;   • CARDIAC CATHETERIZATION N/A 3/5/2020    Procedure: Left ventriculography;  Surgeon: Sourav Cruz MD;  [facility] C[facility];  Service: Cardiovascular;  Laterality: N/A;   • CARDIAC CATHETERIZATION N/A 3/5/2020    Procedure: Stent CARMEN coronary;  Surgeon: Sourav Cruz MD;  Location: [facility] CATH INVASIVE LOC[facility];  Service: Cardiovascular;  Laterality: N/A;   • CORONARY ANGIOPLASTY  04/28/2015    A) Xience Alpine drug eluting stent [id number] overlapping with 3.0x8mm Xience alpine drug eluting stent to cover distal RCA . B) 4.0 x 38mm Xience Alpine drug eluting stent cover the mid RCA which overlaps with the distal stents. C) 3.25 x 33mm Xience Alpine drug eluting stent to mid LAD.    • SHOULDER SURGERY       right    • VASECTOMY       Outpatient Medications Prior to Visit   Medication Sig Dispense Refill   • amLODIPine (NORVASC) 10 MG tablet Take 10 mg by mouth Daily.     • ascorbic acid (VITAMIN C) 1000 MG tablet Take 500 mg by mouth Daily.     • aspirin 81 MG EC tablet Take 81 mg by mouth Daily.     • atorvastatin (LIPITOR) 20 MG tablet Take 2 tablets by mouth Daily. 180 tablet 3   • budesonide-formoterol (SYMBICORT) 160-4.5 MCG/ACT inhaler Inhale 2 puffs Daily As Needed.     • gabapentin (NEURONTIN) 300 MG capsule TAKE 1 CAPSULE BY MOUTH THREE TIMES A DAY AS DIRECTED     • HYDROcodone-acetaminophen (NORCO) 7.5-325 MG per tablet TAKE 1 TABLET 4 TIMES A DAY BY ORAL ROUTE AS NEEDED FOR 15 DAYS. (2/4)     • metoprolol succinate XL (TOPROL-XL) 50 MG 24 hr tablet Take 50 mg by mouth Daily.     • nitroglycerin (NITROSTAT) 0.4 MG SL tablet Place 1 tablet under the tongue Every 5 (Five) Minutes As Needed for Chest Pain. Take no more than 3 doses in 15 minutes. 25 tablet 2   • Omega-3 Fatty Acids (fish oil) 1000 MG capsule capsule Take  by mouth Daily With Breakfast.     • Potassium 99 MG tablet Take  by mouth Daily.     • clopidogrel (PLAVIX) 75 MG tablet Take 1 tablet by mouth Daily. 90 tablet 3     No facility-administered medications prior to visit.       Allergies as of 09/16/2022 - Reviewed 09/16/2022   Allergen Reaction Noted   • Niacin Unknown - Low Severity 10/13/2020   • Relafen [nabumetone] Rash 04/06/2018     Social History     Socioeconomic History   • Marital status:    Tobacco Use   • Smoking status: Current Every Day Smoker     Packs/day: 2.00     Years: 50.00     Pack years: 100.00     Types: Cigarettes   • Smokeless tobacco: Never Used   • Tobacco comment: CAFFEINE USE: 1 POT COFFEE DAILY   Vaping Use   • Vaping Use: Never used   Substance and Sexual Activity   • Alcohol use: No   • Drug use: No   • Sexual activity: Defer     Family History   Problem Relation Age of Onset   • Arthritis Mother    •  "Arthritis Father    • Hypertension Father    • Cancer Paternal Uncle    • Diabetes Maternal Grandmother    • Coronary artery disease Neg Hx        Review of Systems   Constitutional: Negative for malaise/fatigue.   Cardiovascular:        SEE HPI   Respiratory: Negative for shortness of breath.    Hematologic/Lymphatic: Negative for bleeding problem.   Musculoskeletal: Negative for falls.   Gastrointestinal: Negative for melena.   Genitourinary: Negative for hematuria.   Psychiatric/Behavioral: Negative for altered mental status.          Objective:     Vitals:    09/16/22 0839   BP: 118/86   BP Location: Left arm   Patient Position: Sitting   Pulse: 61   Weight: 74.8 kg (165 lb)   Height: 160 cm (63\")     Body mass index is 29.23 kg/m².      PHYSICAL EXAM:  Constitutional:       General: Not in acute distress.     Appearance: Well-developed. Not diaphoretic.   Eyes:      Pupils: Pupils are equal, round, and reactive to light.   HENT:      Head: Normocephalic and atraumatic.   Pulmonary:      Effort: Pulmonary effort is normal. No respiratory distress.      Breath sounds: Normal breath sounds. No wheezing. No rales.   Cardiovascular:      Normal rate. Regular rhythm.      Murmurs: There is no murmur.      No gallop. No click. No rub.   Edema:     Peripheral edema absent.   Abdominal:      General: Bowel sounds are normal.      Palpations: Abdomen is soft.   Musculoskeletal: Normal range of motion.         General: No tenderness or deformity. Skin:     General: Skin is warm and dry.      Findings: No erythema.   Neurological:      Mental Status: Alert and oriented to person, place, and time.             ECG 12 Lead    Date/Time: 9/16/2022 8:54 AM  Performed by: Kim Rosas DNP, APRN  Authorized by: Kim Rosas DNP, COLBY   Comparison: compared with previous ECG from 7/8/2021  Rhythm: sinus rhythm  BPM: 61  Comments: No significant changes from previous EKGs              Assessment:       Diagnosis Plan "   1. Coronary artery disease involving native coronary artery of native heart without angina pectoris     2. Primary hypertension           Plan:     1. Coronary artery disease: Remains on aspirin 81 mg daily indefinitely.  Plavix previously stopped due to bruising issues.  Remains on high intensity statin.  2. Hypertension: On amlodipine, well-controlled in the office today.  3. Hyperlipidemia: On statin.  We will request copy of last labs from PCP.  LDL goal less than 70.  4. Nicotine dependence: Unfortunately patient is still smoking.  Discussed risks.  He will consider cessation and notify office if he needs assistance with this.    Patient to follow-up with Dr. Morrow in 6 months or follow-up sooner if needed for any new, recurrent, or worsening symptoms prior to that time.  Discussed in detail signs/symptoms that warrant sooner call or follow-up to the office or ER visit.            Your medication list          Accurate as of September 16, 2022  9:14 AM. If you have any questions, ask your nurse or doctor.            CONTINUE taking these medications      Instructions Last Dose Given Next Dose Due   amLODIPine 10 MG tablet  Commonly known as: NORVASC      Take 10 mg by mouth Daily.       ascorbic acid 1000 MG tablet  Commonly known as: VITAMIN C      Take 500 mg by mouth Daily.       aspirin 81 MG EC tablet      Take 81 mg by mouth Daily.       atorvastatin 20 MG tablet  Commonly known as: LIPITOR      Take 2 tablets by mouth Daily.       budesonide-formoterol 160-4.5 MCG/ACT inhaler  Commonly known as: SYMBICORT      Inhale 2 puffs Daily As Needed.       fish oil 1000 MG capsule capsule      Take  by mouth Daily With Breakfast.       gabapentin 300 MG capsule  Commonly known as: NEURONTIN      TAKE 1 CAPSULE BY MOUTH THREE TIMES A DAY AS DIRECTED       HYDROcodone-acetaminophen 7.5-325 MG per tablet  Commonly known as: NORCO      TAKE 1 TABLET 4 TIMES A DAY BY ORAL ROUTE AS NEEDED FOR 15 DAYS. (2/4)        metoprolol succinate XL 50 MG 24 hr tablet  Commonly known as: TOPROL-XL      Take 50 mg by mouth Daily.       nitroglycerin 0.4 MG SL tablet  Commonly known as: NITROSTAT      Place 1 tablet under the tongue Every 5 (Five) Minutes As Needed for Chest Pain. Take no more than 3 doses in 15 minutes.       Potassium 99 MG tablet      Take  by mouth Daily.              The above medication changes may not have been made by this provider.  Patient's medication list was updated to reflect medications they are currently taking including medication changes made by other providers.            Thanks,    Kim Rosas, DNP, APRN  09/16/2022         Dictated utilizing Dragon dictation

## 2022-10-24 ENCOUNTER — TELEPHONE (OUTPATIENT)
Dept: CARDIOLOGY | Facility: CLINIC | Age: 72
End: 2022-10-24

## 2022-10-24 NOTE — TELEPHONE ENCOUNTER
[phone]    Pt called and wants to know if we can prescribe him something to help him quit smoking? Please advise.    Thanks   Lilian Hoffman MA

## 2022-10-24 NOTE — TELEPHONE ENCOUNTER
Did he have something particular in mind that he was wanting to try for smoking cessation?  I believe we had discussed maybe nicotine patches?  Please let me know what he was thinking.

## 2022-10-26 RX ORDER — NICOTINE 21 MG/24HR
1 PATCH, TRANSDERMAL 24 HOURS TRANSDERMAL EVERY 24 HOURS
Qty: 42 PATCH | Refills: 0 | Status: SHIPPED | OUTPATIENT
Start: 2022-10-26 | End: 2022-10-26

## 2022-10-26 RX ORDER — NICOTINE 21 MG/24HR
1 PATCH, TRANSDERMAL 24 HOURS TRANSDERMAL EVERY 24 HOURS
Qty: 42 PATCH | Refills: 0 | Status: SHIPPED | OUTPATIENT
Start: 2022-10-26

## 2022-10-26 RX ORDER — NICOTINE 21 MG/24HR
1 PATCH, TRANSDERMAL 24 HOURS TRANSDERMAL EVERY 24 HOURS
Qty: 14 PATCH | Refills: 0 | Status: SHIPPED | OUTPATIENT
Start: 2022-10-26

## 2022-10-26 NOTE — TELEPHONE ENCOUNTER
I called and had a long detailed discussion with patient about nicotine patches.  He is currently smoking about 2 packs of cigarettes a day.  He is ready to quit.  We discussed choosing his quit date on his calendar.  He will start with a 21 mg patch daily for 6 weeks and then go down to a 14 mg patch daily for 2 weeks and then a 7 mg patch daily for 2 weeks before stopping.  We discussed proper use of patches in great detail and benefits versus risks.  He is aware of need to take off old patch every morning before placing new patch and rotate sites.  He is aware of importance of not smoking with patch in place.  He also has an appointment next week with his PCP and he is going to discuss possible Wellbutrin to further aid with smoking cessation.  He will also defer further management of smoking cessation to PCP at that time.

## 2023-03-20 ENCOUNTER — OFFICE VISIT (OUTPATIENT)
Dept: CARDIOLOGY | Facility: CLINIC | Age: 73
End: 2023-03-20
Payer: MEDICARE

## 2023-03-20 VITALS
OXYGEN SATURATION: 98 % | BODY MASS INDEX: 29.38 KG/M2 | SYSTOLIC BLOOD PRESSURE: 160 MMHG | WEIGHT: 165.8 LBS | HEIGHT: 63 IN | DIASTOLIC BLOOD PRESSURE: 90 MMHG | HEART RATE: 102 BPM

## 2023-03-20 DIAGNOSIS — E78.2 MIXED HYPERLIPIDEMIA: ICD-10-CM

## 2023-03-20 DIAGNOSIS — I10 PRIMARY HYPERTENSION: ICD-10-CM

## 2023-03-20 DIAGNOSIS — Z72.0 TOBACCO ABUSE: ICD-10-CM

## 2023-03-20 DIAGNOSIS — I25.10 CORONARY ARTERY DISEASE INVOLVING NATIVE CORONARY ARTERY OF NATIVE HEART WITHOUT ANGINA PECTORIS: Primary | ICD-10-CM

## 2023-03-20 PROCEDURE — 3080F DIAST BP >= 90 MM HG: CPT | Performed by: INTERNAL MEDICINE

## 2023-03-20 PROCEDURE — 99214 OFFICE O/P EST MOD 30 MIN: CPT | Performed by: INTERNAL MEDICINE

## 2023-03-20 PROCEDURE — 1160F RVW MEDS BY RX/DR IN RCRD: CPT | Performed by: INTERNAL MEDICINE

## 2023-03-20 PROCEDURE — 1159F MED LIST DOCD IN RCRD: CPT | Performed by: INTERNAL MEDICINE

## 2023-03-20 PROCEDURE — 3077F SYST BP >= 140 MM HG: CPT | Performed by: INTERNAL MEDICINE

## 2023-03-20 NOTE — PROGRESS NOTES
[facility]      Patient Name: David Austin Jr.  :1950  Age: 72 y.o.  Encounter Provider:  Fernando Morrow Jr, MD      Chief Complaint:   Chief Complaint   Patient presents with   • Coronary Artery Disease         HPI  David Austin Jr. is a 72 y.o. male past medical history of coronary artery disease, hypertension and dyslipidemia presents for routine follow-up.  Patient was previously followed by Dr. Magallon and I have reviewed all pertinent electronic health records.  He was evaluated in  for unstable angina had diagnostic left heart catheterization showing multivessel coronary artery disease.  Drug-eluting stents placed in the RCA as well as a single stent in the LAD.  Since then he is done fairly well until 2020 when he was having more chest pain and ended up having PCI to the distal left circumflex artery.  Unfortunately continues to smoke cigarettes and is currently smoking a pack and half cigarettes daily.  Blood pressure is elevated today in clinic at 160/90 mmHg.  He states he does a lot of walking and working around the home with no chest pain or shortness of air.  No orthopnea, PND or edema.  No palpitations, dizziness or syncope.  Social family history is otherwise reviewed and not pertinent to this clinic visit.  No cardiac complaints at time of interview.      The following portions of the patient's history were reviewed and updated as appropriate: allergies, current medications, past family history, past medical history, past social history, past surgical history and problem list.      Review of Systems   Constitutional: Negative for chills and fever.   HENT: Negative for hoarse voice and sore throat.    Eyes: Negative for double vision and photophobia.   Cardiovascular: Negative for chest pain, leg swelling, near-syncope, orthopnea, palpitations, paroxysmal nocturnal dyspnea and syncope.   Respiratory: Negative for cough and wheezing.    Skin: Negative for  "poor wound healing and rash.   Musculoskeletal: Negative for arthritis and joint swelling.   Gastrointestinal: Negative for bloating, abdominal pain, hematemesis and hematochezia.   Neurological: Negative for dizziness and focal weakness.   Psychiatric/Behavioral: Negative for depression and suicidal ideas.       OBJECTIVE:   Vital Signs  Vitals:    03/20/23 1129   BP: 160/90   Pulse: 102   SpO2: 98%     Estimated body mass index is 29.37 kg/m² as calculated from the following:    Height as of this encounter: 160 cm (63\").    Weight as of this encounter: 75.2 kg (165 lb 12.8 oz).    Vitals reviewed.   Constitutional:       Appearance: Healthy appearance. Not in distress.   Neck:      Vascular: No JVR. JVD normal.   Pulmonary:      Effort: Pulmonary effort is normal.      Breath sounds: Normal breath sounds. No wheezing. No rhonchi. No rales.   Chest:      Chest wall: Not tender to palpatation.   Cardiovascular:      PMI at left midclavicular line. Normal rate. Regular rhythm. Normal S1. Normal S2.      Murmurs: There is no murmur.      No gallop. No click. No rub.   Pulses:     Intact distal pulses.   Edema:     Peripheral edema absent.   Abdominal:      General: Bowel sounds are normal.      Palpations: Abdomen is soft.      Tenderness: There is no abdominal tenderness.   Musculoskeletal: Normal range of motion.         General: No tenderness. Skin:     General: Skin is warm and dry.   Neurological:      General: No focal deficit present.      Mental Status: Alert and oriented to person, place and time.         Procedures         BUN   Date Value Ref Range Status   03/06/2020 16 8 - 23 mg/dL Final     Creatinine   Date Value Ref Range Status   03/06/2020 0.90 0.76 - 1.27 mg/dL Final     Potassium   Date Value Ref Range Status   03/06/2020 4.2 3.5 - 5.2 mmol/L Final     ALT (SGPT)   Date Value Ref Range Status   04/06/2018 29 1 - 41 U/L Final     AST (SGOT)   Date Value Ref Range Status   04/06/2018 14 1 - 40 U/L " Final           ASSESSMENT:     72-year-old male with history of tobacco abuse, multivessel coronary artery disease status post PCI presents for routine follow-up    PLAN OF CARE:     1. CAD -no angina.  We will request lipid profile from PCP office.  Continue aspirin, beta-blocker and statin.  Increase activity as tolerated.  2. Hypertension -elevated today in clinic.  Twice daily blood pressure log to be sent in after 1 week.  Sodium restricted diet.  3. Dyslipidemia -as above  4. Tobacco abuse -counseled on need for cessation and abstinence from nicotine products    Return to clinic 6 months             Discharge Medications          Accurate as of March 20, 2023 11:32 AM. If you have any questions, ask your nurse or doctor.            Continue These Medications      Instructions Start Date   amLODIPine 10 MG tablet  Commonly known as: NORVASC   10 mg, Daily      ascorbic acid 1000 MG tablet  Commonly known as: VITAMIN C   500 mg, Oral, Daily      aspirin 81 MG EC tablet   81 mg, Oral, Daily      atorvastatin 20 MG tablet  Commonly known as: LIPITOR   40 mg, Oral, Daily      budesonide-formoterol 160-4.5 MCG/ACT inhaler  Commonly known as: SYMBICORT   2 puffs, Inhalation, Daily PRN      fish oil 1000 MG capsule capsule   Oral, Daily With Breakfast      gabapentin 300 MG capsule  Commonly known as: NEURONTIN   TAKE 1 CAPSULE BY MOUTH THREE TIMES A DAY AS DIRECTED      HYDROcodone-acetaminophen 7.5-325 MG per tablet  Commonly known as: NORCO   TAKE 1 TABLET 4 TIMES A DAY BY ORAL ROUTE AS NEEDED FOR 15 DAYS. (2/4)      metoprolol succinate XL 50 MG 24 hr tablet  Commonly known as: TOPROL-XL   50 mg, Oral, Daily, Pt reports taking 1 1/2 tablets daily      nicotine 14 MG/24HR patch  Commonly known as: Nicoderm CQ   1 patch, Transdermal, Every 24 Hours, STEP 2: Each morning for 2 weeks, remove old patch and place new patch.  Rotate sites.  Do not smoke with patch in place.      nicotine 21 MG/24HR patch  Commonly known  as: Nicoderm CQ   1 patch, Transdermal, Every 24 Hours, STEP 1: Each morning for 6 weeks, remove old patch and place new patch.  Rotate sites.  Do not smoke with patch in place.      nicotine 7 MG/24HR patch  Commonly known as: Nicoderm CQ   1 patch, Transdermal, Every 24 Hours, STEP 3: Each morning for 2 weeks, remove old patch and place new patch.  Rotate sites.  Do not smoke with patch in place.      nitroglycerin 0.4 MG SL tablet  Commonly known as: NITROSTAT   0.4 mg, Sublingual, Every 5 Minutes PRN, Take no more than 3 doses in 15 minutes.       Potassium 99 MG tablet   Oral, Daily             Thank you for allowing me to participate in the care of your patient,      Sincerely,   Fernando Morrow MD  [facility]  03/20/23  11:32 EDT

## 2023-03-23 RX ORDER — NITROGLYCERIN 0.4 MG/1
0.4 TABLET SUBLINGUAL
Qty: 25 TABLET | Refills: 2 | Status: SHIPPED | OUTPATIENT
Start: 2023-03-23

## 2023-04-04 ENCOUNTER — TELEPHONE (OUTPATIENT)
Dept: CARDIOLOGY | Facility: CLINIC | Age: 73
End: 2023-04-04
Payer: MEDICARE

## 2023-06-08 PROBLEM — I70.90 ARTERIAL OCCLUSION: Status: ACTIVE | Noted: 2023-06-08

## 2023-06-08 PROBLEM — I96 GANGRENOUS TOE: Status: ACTIVE | Noted: 2023-06-08

## 2023-06-08 PROBLEM — E03.9 HYPOTHYROIDISM (ACQUIRED): Status: ACTIVE | Noted: 2023-06-08

## 2023-06-08 PROBLEM — E11.9 T2DM (TYPE 2 DIABETES MELLITUS): Status: ACTIVE | Noted: 2023-06-08

## 2023-06-08 PROBLEM — L03.113 RIGHT ARM CELLULITIS: Status: ACTIVE | Noted: 2023-06-08

## 2023-06-08 PROBLEM — R41.82 ALTERED MENTAL STATUS: Status: ACTIVE | Noted: 2023-06-08

## 2023-06-08 PROBLEM — F03.90 DEMENTIA: Status: ACTIVE | Noted: 2023-06-08

## 2023-06-08 PROBLEM — I70.209 OCCLUSION OF ARTERY OF LOWER EXTREMITY: Status: ACTIVE | Noted: 2023-06-08

## 2023-06-08 PROBLEM — Z72.0 TOBACCO USE: Status: ACTIVE | Noted: 2023-06-08

## 2023-06-08 PROBLEM — L03.90 CELLULITIS: Status: ACTIVE | Noted: 2023-06-08

## 2023-07-07 PROBLEM — R07.9 CHEST PAIN: Status: ACTIVE | Noted: 2023-07-07

## 2023-07-13 ENCOUNTER — TELEPHONE (OUTPATIENT)
Dept: CARDIOLOGY | Facility: CLINIC | Age: 73
End: 2023-07-13

## 2023-07-13 NOTE — TELEPHONE ENCOUNTER
Caller: David Austin Jr.    Relationship: Self    Best call back number: [phone]    What is the best time to reach you: ANYTIME    Who are you requesting to speak with (clinical staff, provider,  specific staff member):         What was the call regarding: PT NEEDS HOSPITAL F/U APPT    Is it okay if the provider responds through MyChart: NO

## 2023-09-26 ENCOUNTER — OFFICE VISIT (OUTPATIENT)
Dept: CARDIOLOGY | Facility: CLINIC | Age: 73
End: 2023-09-26
Payer: MEDICARE

## 2023-09-26 VITALS
HEART RATE: 76 BPM | DIASTOLIC BLOOD PRESSURE: 80 MMHG | HEIGHT: 63 IN | SYSTOLIC BLOOD PRESSURE: 140 MMHG | BODY MASS INDEX: 29.06 KG/M2 | WEIGHT: 164 LBS | OXYGEN SATURATION: 98 %

## 2023-09-26 DIAGNOSIS — I10 PRIMARY HYPERTENSION: ICD-10-CM

## 2023-09-26 DIAGNOSIS — I25.10 CORONARY ARTERY DISEASE INVOLVING NATIVE CORONARY ARTERY OF NATIVE HEART WITHOUT ANGINA PECTORIS: Primary | ICD-10-CM

## 2023-09-26 DIAGNOSIS — Z72.0 TOBACCO ABUSE: ICD-10-CM

## 2023-09-26 DIAGNOSIS — E78.2 MIXED HYPERLIPIDEMIA: ICD-10-CM

## 2023-09-26 PROCEDURE — 99214 OFFICE O/P EST MOD 30 MIN: CPT | Performed by: NURSE PRACTITIONER

## 2023-09-26 PROCEDURE — 3079F DIAST BP 80-89 MM HG: CPT | Performed by: NURSE PRACTITIONER

## 2023-09-26 PROCEDURE — 1159F MED LIST DOCD IN RCRD: CPT | Performed by: NURSE PRACTITIONER

## 2023-09-26 PROCEDURE — 1160F RVW MEDS BY RX/DR IN RCRD: CPT | Performed by: NURSE PRACTITIONER

## 2023-09-26 PROCEDURE — 3077F SYST BP >= 140 MM HG: CPT | Performed by: NURSE PRACTITIONER

## 2023-09-26 RX ORDER — METOPROLOL SUCCINATE 50 MG/1
TABLET, EXTENDED RELEASE ORAL
COMMUNITY

## 2023-09-26 NOTE — PROGRESS NOTES
CARDIOLOGY        Patient Name: David Austin Jr.  :1950  Age: 72 y.o.  Primary Cardiologist: Fernando Morrow MD  Encounter Provider:  COLBY Scruggs    Date of Service: 23        CHIEF COMPLAINT / REASON FOR OFFICE VISIT     Coronary Artery Disease      HISTORY OF PRESENT ILLNESS       HPI  David Austin Jr. is a 72 y.o. male who presents today for hospital reevaluation    Pt has a  history significant for CAD, hypertension, hyperlipidemia, tobacco abuse.     Review of medical records reveals patient was hospitalized with discharge date 2023.  Patient presented with worsening chest discomfort over the last several months.  Patient felt that symptoms were occurring more frequently.  Yesterday evening he had an episode that lasted for 20 minutes with associated left arm discomfort.  He went to an outside facility and was transferred to [facility].  No acute ECG changes.  Troponin not consistent with ACS.  Patient had a cardiac catheterization where he was found to have severe proximal circumflex stenosis and underwent successful PCI with CARMEN.  Patent LAD and RAD stents.  Continue DAPT.  Beta-blocker ordered.  Patient referred for cardiac rehab.    Patient reports that he has done well since discharge.  He reports that he has not experienced any further episodes of chest pain.  He questions his metoprolol succinate dose.  He reports that he was on metoprolol succinate 50mg per day, he was placed on 12.5 mg in hospital.  Upon reviewing records, it does not appear that we knew he was on this at home.  He denies shortness of breath, palpitations, dizziness, fatigue.  He continues to smoke at least 1-2 ppd, no desire to stop.       The following portions of the patient's history were reviewed and updated as appropriate: allergies, current medications, past family history, past medical history, past social history, past surgical history and problem list.      VITAL SIGNS  "    Visit Vitals  /80   Pulse 76   Ht 160 cm (63\")   Wt 74.4 kg (164 lb)   SpO2 98%   BMI 29.05 kg/m²         Wt Readings from Last 3 Encounters:   09/26/23 74.4 kg (164 lb)   07/06/23 73.8 kg (162 lb 12.8 oz)   03/20/23 75.2 kg (165 lb 12.8 oz)     Body mass index is 29.05 kg/m².      REVIEW OF SYSTEMS   Review of Systems   Constitutional: Negative for chills, fever, weight gain and weight loss.   Cardiovascular:  Negative for leg swelling.   Respiratory:  Negative for cough, snoring and wheezing.    Hematologic/Lymphatic: Negative for bleeding problem. Does not bruise/bleed easily.   Skin:  Negative for color change.   Musculoskeletal:  Negative for falls, joint pain and myalgias.   Gastrointestinal:  Negative for melena.   Genitourinary:  Negative for hematuria.   Neurological:  Negative for excessive daytime sleepiness.   Psychiatric/Behavioral:  Negative for depression. The patient is not nervous/anxious.          PHYSICAL EXAMINATION     Constitutional:       Appearance: Normal appearance. Well-developed.   Eyes:      Conjunctiva/sclera: Conjunctivae normal.   Neck:      Vascular: No carotid bruit.   Pulmonary:      Effort: Pulmonary effort is normal.      Breath sounds: Normal breath sounds.   Cardiovascular:      Normal rate. Regular rhythm. Normal S1. Normal S2.       Murmurs: There is no murmur.      No gallop.  No click. No rub.      Comments: Right radial cath site well healed without erythema or ecchymosis, palpable proximal and distal pulses, good capillary refill, good motor function of hand, no thrill or bruit detected, site is soft and non-tender with no hematoma, no sensory deficits noted.      Edema:     Peripheral edema absent.   Musculoskeletal: Normal range of motion. Skin:     General: Skin is warm and dry.   Neurological:      Mental Status: Alert and oriented to person, place, and time.      GCS: GCS eye subscore is 4. GCS verbal subscore is 5. GCS motor subscore is 6.   Psychiatric:   "       Speech: Speech normal.         Behavior: Behavior normal.         Thought Content: Thought content normal.         Judgment: Judgment normal.         REVIEWED DATA     Procedures    Cardiac Procedures:  Cardiac catheterization 4/28/2015.  Placement of CARMEN to mid to distal RCA and mid LAD.  Cardiac catheterization 4/8/2018.  Right dominant system.  Widely patent LAD and RCA stents.  Myocardial perfusion Cardiolite stress test 3/5/2020.  Medium sized, moderately severe area of ischemia in the inferior wall.  LVEF greater than 70%.  Impressions consistent with high risk study.  Cardiac catheterization 3/5/2020.  70% stenosis to the left circumflex.  Successful PCI with drug-eluting stent placement.  Patent LAD and RCA stents.  Cardiac catheterization 7/7/2023.  Successful PCI to severe proximal circumflex with CARMEN.  Patent LAD and RCA stents.    Lipid Panel          7/8/2023    03:19   Lipid Panel   Total Cholesterol 117    Triglycerides 281    HDL Cholesterol 26    VLDL Cholesterol 44    LDL Cholesterol  47    LDL/HDL Ratio 1.34        Lab Results   Component Value Date     07/08/2023     07/07/2023    K 4.0 07/08/2023    K 4.2 07/07/2023     07/08/2023     (H) 07/07/2023    CO2 22.4 07/08/2023    CO2 24.9 07/07/2023    BUN 16 07/08/2023    BUN 15 07/07/2023    CREATININE 0.86 07/08/2023    CREATININE 0.80 07/07/2023    EGFRIFNONA 83 03/06/2020    EGFRIFNONA 95 03/05/2020    GLUCOSE 119 (H) 07/08/2023    GLUCOSE 105 (H) 07/07/2023    CALCIUM 8.4 (L) 07/08/2023    CALCIUM 9.3 07/07/2023    ALBUMIN 4.20 04/06/2018    ALBUMIN 4.3 04/28/2015    BILITOT 0.4 04/06/2018    BILITOT 0.2 04/28/2015    AST 14 04/06/2018    AST 14 04/28/2015    ALT 29 04/06/2018    ALT 11 04/28/2015     Lab Results   Component Value Date    WBC 9.15 07/08/2023    WBC 9.99 07/07/2023    HGB 13.2 07/08/2023    HGB 13.5 07/07/2023    HCT 39.6 07/08/2023    HCT 40.2 07/07/2023    MCV 87.2 07/08/2023    MCV 86.3  07/07/2023     07/08/2023     07/07/2023     Lab Results   Component Value Date    BNP 23 07/06/2023     Lab Results   Component Value Date    TROPONINT 19 (H) 07/07/2023     Lab Results   Component Value Date    TSH 2.33 04/28/2015             ASSESSMENT & PLAN     Diagnoses and all orders for this visit:    1. Coronary artery disease involving native coronary artery of native heart without angina pectoris (Primary)  Now status post CARMEN to proximal circumflex.  Patent LAD and RCA stents  Continue DAPT with clopidogrel and aspirin  Continue atorvastatin, metoprolol succinate  Patient continues to smoke 1 to 2 packs of cigarettes per day with no desire to stop smoking.  Risks associated with smoking were reviewed with patient.    2. Primary hypertension  Blood pressure controlled at PCP doctors appointments, BP slightly elevated in clinic today at 140/80  Patient will continue to keep blood pressure diary  Continue metoprolol succinate 50 mg/day, patient had dizziness when he was on 75 mg/day    3. Mixed hyperlipidemia  Goal LDL less than 70, LDL at goal.  Continue atorvastatin    4. Tobacco abuse          Return in about 3 months (around 12/26/2023) for Dr. Morrow[facility] follow up.    Future Appointments         Provider Department Center    12/7/2023 11:30 AM Fernando Morrow Jr., MD [facility] CARDIOLOGY [place]                    MEDICATIONS         Discharge Medications            Accurate as of September 26, 2023  9:15 AM. If you have any questions, ask your nurse or doctor.                Changes to Medications        Instructions Start Date   metoprolol succinate XL 50 MG 24 hr tablet  Commonly known as: TOPROL-XL  What changed: Another medication with the same name was removed. Continue taking this medication, and follow the directions you see here.  Changed by: COLBY Scruggs   Take 1.5 tablet daily             Continue These Medications        Instructions Start Date    aspirin 81 MG EC tablet   81 mg, Oral, Daily      atorvastatin 40 MG tablet  Commonly known as: LIPITOR   40 mg, Oral, Daily      budesonide-formoterol 160-4.5 MCG/ACT inhaler  Commonly known as: SYMBICORT   2 puffs, Inhalation, Daily PRN      clopidogrel 75 MG tablet  Commonly known as: PLAVIX   75 mg, Oral, Daily      gabapentin 300 MG capsule  Commonly known as: NEURONTIN   1 capsule Daily.      VITAMIN C PO   1 tablet, Oral, Daily                   **Dragon Disclaimer:   Much of this encounter note is an electronic transcription/translation of spoken language to printed text. The electronic translation of spoken language may permit erroneous, or at times, nonsensical words or phrases to be inadvertently transcribed. Although I have reviewed the note for such errors, some may still exist.

## (undated) DEVICE — CATH VENT MIV RADL PIG ST TIP 5F 110CM

## (undated) DEVICE — TREK CORONARY DILATATION CATHETER 2.50 MM X 15 MM / RAPID-EXCHANGE: Brand: TREK

## (undated) DEVICE — PK CATH CARD 40

## (undated) DEVICE — TR BAND RADIAL ARTERY COMPRESSION DEVICE: Brand: TR BAND

## (undated) DEVICE — KT MANIFLD CARDIAC

## (undated) DEVICE — GW EMR FIX EXCHG J STD .035 3MM 260CM

## (undated) DEVICE — CATH DIAG IMPULSE FL3.5 5F 100CM

## (undated) DEVICE — GLIDESHEATH SLENDER STAINLESS STEEL KIT: Brand: GLIDESHEATH SLENDER

## (undated) DEVICE — CATH DIAG IMPULSE FR4 5F 100CM

## (undated) DEVICE — GUIDE CATHETER: Brand: MACH1™

## (undated) DEVICE — HI-TORQUE EXTRA S'PORT GUIDE WIRE .014 STRAIGHT TIP 3.0 CM X 190 CM: Brand: HI-TORQUE EXTRA S'PORT

## (undated) DEVICE — DEV INDEFLATOR

## (undated) DEVICE — GLIDESHEATH BASIC HYDROPHILIC COATED INTRODUCER SHEATH: Brand: GLIDESHEATH